# Patient Record
Sex: MALE | Race: ASIAN | Employment: OTHER | URBAN - METROPOLITAN AREA
[De-identification: names, ages, dates, MRNs, and addresses within clinical notes are randomized per-mention and may not be internally consistent; named-entity substitution may affect disease eponyms.]

---

## 2024-10-07 ENCOUNTER — HOSPITAL ENCOUNTER (OUTPATIENT)
Facility: HOSPITAL | Age: 79
Setting detail: OBSERVATION
Discharge: HOME | End: 2024-10-12
Attending: EMERGENCY MEDICINE | Admitting: STUDENT IN AN ORGANIZED HEALTH CARE EDUCATION/TRAINING PROGRAM
Payer: COMMERCIAL

## 2024-10-07 DIAGNOSIS — E87.6 HYPOKALEMIA: Primary | ICD-10-CM

## 2024-10-07 DIAGNOSIS — N17.9 AKI (ACUTE KIDNEY INJURY) (HCC): ICD-10-CM

## 2024-10-07 DIAGNOSIS — E87.6 ACUTE HYPOKALEMIA: ICD-10-CM

## 2024-10-07 DIAGNOSIS — R01.1 HEART MURMUR: ICD-10-CM

## 2024-10-07 DIAGNOSIS — N30.00 ACUTE CYSTITIS WITHOUT HEMATURIA: ICD-10-CM

## 2024-10-07 DIAGNOSIS — R94.31 ABNORMAL ELECTROCARDIOGRAPHY: ICD-10-CM

## 2024-10-07 DIAGNOSIS — E86.0 DEHYDRATION: ICD-10-CM

## 2024-10-07 PROCEDURE — 99285 EMERGENCY DEPT VISIT HI MDM: CPT

## 2024-10-08 ENCOUNTER — APPOINTMENT (OUTPATIENT)
Facility: HOSPITAL | Age: 79
End: 2024-10-08
Payer: COMMERCIAL

## 2024-10-08 PROBLEM — E87.6 HYPOKALEMIA: Status: ACTIVE | Noted: 2024-10-08

## 2024-10-08 LAB
ALBUMIN SERPL-MCNC: 2.9 G/DL (ref 3.5–5)
ALBUMIN/GLOB SERPL: 0.7 (ref 1.1–2.2)
ALP SERPL-CCNC: 64 U/L (ref 45–117)
ALT SERPL-CCNC: 14 U/L (ref 12–78)
ANION GAP SERPL CALC-SCNC: 9 MMOL/L (ref 2–12)
APPEARANCE UR: CLEAR
AST SERPL-CCNC: 43 U/L (ref 15–37)
BACTERIA URNS QL MICRO: NEGATIVE /HPF
BASOPHILS # BLD: 0 K/UL (ref 0–0.1)
BASOPHILS NFR BLD: 0 % (ref 0–1)
BILIRUB SERPL-MCNC: 0.8 MG/DL (ref 0.2–1)
BILIRUB UR QL: NEGATIVE
BUN SERPL-MCNC: 35 MG/DL (ref 6–20)
BUN/CREAT SERPL: 15 (ref 12–20)
CALCIUM SERPL-MCNC: 8.7 MG/DL (ref 8.5–10.1)
CHLORIDE SERPL-SCNC: 102 MMOL/L (ref 97–108)
CHOLEST SERPL-MCNC: 123 MG/DL
CO2 SERPL-SCNC: 28 MMOL/L (ref 21–32)
COLOR UR: ABNORMAL
COMMENT:: NORMAL
CREAT SERPL-MCNC: 2.31 MG/DL (ref 0.7–1.3)
CREAT UR-MCNC: 34.2 MG/DL
DIFFERENTIAL METHOD BLD: ABNORMAL
ECHO BSA: 1.46 M2
EOSINOPHIL # BLD: 0 K/UL (ref 0–0.4)
EOSINOPHIL NFR BLD: 0 % (ref 0–7)
EPITH CASTS URNS QL MICRO: ABNORMAL /LPF
ERYTHROCYTE [DISTWIDTH] IN BLOOD BY AUTOMATED COUNT: 13.5 % (ref 11.5–14.5)
EST. AVERAGE GLUCOSE BLD GHB EST-MCNC: 131 MG/DL
GLOBULIN SER CALC-MCNC: 4.3 G/DL (ref 2–4)
GLUCOSE BLD STRIP.AUTO-MCNC: 138 MG/DL (ref 65–117)
GLUCOSE SERPL-MCNC: 124 MG/DL (ref 65–100)
GLUCOSE UR STRIP.AUTO-MCNC: NEGATIVE MG/DL
HBA1C MFR BLD: 6.2 % (ref 4–5.6)
HCT VFR BLD AUTO: 36.5 % (ref 36.6–50.3)
HDLC SERPL-MCNC: 36 MG/DL
HDLC SERPL: 3.4 (ref 0–5)
HGB BLD-MCNC: 12.8 G/DL (ref 12.1–17)
HGB UR QL STRIP: ABNORMAL
HYALINE CASTS URNS QL MICRO: ABNORMAL /LPF (ref 0–2)
IMM GRANULOCYTES # BLD AUTO: 0.1 K/UL (ref 0–0.04)
IMM GRANULOCYTES NFR BLD AUTO: 1 % (ref 0–0.5)
KETONES UR QL STRIP.AUTO: NEGATIVE MG/DL
LACTATE BLD-SCNC: 1.25 MMOL/L (ref 0.4–2)
LACTATE SERPL-SCNC: 1.3 MMOL/L (ref 0.4–2)
LDLC SERPL CALC-MCNC: 73.2 MG/DL (ref 0–100)
LEUKOCYTE ESTERASE UR QL STRIP.AUTO: ABNORMAL
LYMPHOCYTES # BLD: 1.4 K/UL (ref 0.8–3.5)
LYMPHOCYTES NFR BLD: 13 % (ref 12–49)
MAGNESIUM SERPL-MCNC: 1.7 MG/DL (ref 1.6–2.4)
MCH RBC QN AUTO: 32 PG (ref 26–34)
MCHC RBC AUTO-ENTMCNC: 35.1 G/DL (ref 30–36.5)
MCV RBC AUTO: 91.3 FL (ref 80–99)
MONOCYTES # BLD: 0.9 K/UL (ref 0–1)
MONOCYTES NFR BLD: 8 % (ref 5–13)
NEUTS SEG # BLD: 8.1 K/UL (ref 1.8–8)
NEUTS SEG NFR BLD: 78 % (ref 32–75)
NITRITE UR QL STRIP.AUTO: NEGATIVE
NRBC # BLD: 0 K/UL (ref 0–0.01)
NRBC BLD-RTO: 0 PER 100 WBC
PH UR STRIP: 6.5 (ref 5–8)
PLATELET # BLD AUTO: 264 K/UL (ref 150–400)
PMV BLD AUTO: 9.4 FL (ref 8.9–12.9)
POTASSIUM SERPL-SCNC: 1.6 MMOL/L (ref 3.5–5.1)
POTASSIUM SERPL-SCNC: 2.3 MMOL/L (ref 3.5–5.1)
PROCALCITONIN SERPL-MCNC: 0.83 NG/ML
PROT SERPL-MCNC: 7.2 G/DL (ref 6.4–8.2)
PROT UR STRIP-MCNC: 30 MG/DL
RBC # BLD AUTO: 4 M/UL (ref 4.1–5.7)
RBC #/AREA URNS HPF: ABNORMAL /HPF (ref 0–5)
SERVICE CMNT-IMP: ABNORMAL
SODIUM SERPL-SCNC: 139 MMOL/L (ref 136–145)
SODIUM UR-SCNC: 67 MMOL/L
SP GR UR REFRACTOMETRY: 1.01
SPECIMEN HOLD: NORMAL
TRIGL SERPL-MCNC: 69 MG/DL
TROPONIN I SERPL HS-MCNC: 204 NG/L (ref 0–76)
TROPONIN I SERPL HS-MCNC: 220 NG/L (ref 0–76)
TROPONIN I SERPL HS-MCNC: 303 NG/L (ref 0–76)
URATE SERPL-MCNC: 10.3 MG/DL (ref 3.5–7.2)
URINE CULTURE IF INDICATED: ABNORMAL
UROBILINOGEN UR QL STRIP.AUTO: 0.2 EU/DL (ref 0.2–1)
UUN UR-MCNC: 235 MG/DL
VLDLC SERPL CALC-MCNC: 13.8 MG/DL
WBC # BLD AUTO: 10.5 K/UL (ref 4.1–11.1)
WBC URNS QL MICRO: ABNORMAL /HPF (ref 0–4)

## 2024-10-08 PROCEDURE — 96372 THER/PROPH/DIAG INJ SC/IM: CPT

## 2024-10-08 PROCEDURE — 96368 THER/DIAG CONCURRENT INF: CPT

## 2024-10-08 PROCEDURE — 2580000003 HC RX 258: Performed by: STUDENT IN AN ORGANIZED HEALTH CARE EDUCATION/TRAINING PROGRAM

## 2024-10-08 PROCEDURE — 6370000000 HC RX 637 (ALT 250 FOR IP): Performed by: STUDENT IN AN ORGANIZED HEALTH CARE EDUCATION/TRAINING PROGRAM

## 2024-10-08 PROCEDURE — 3430000000 HC RX DIAGNOSTIC RADIOPHARMACEUTICAL: Performed by: NURSE PRACTITIONER

## 2024-10-08 PROCEDURE — 84132 ASSAY OF SERUM POTASSIUM: CPT

## 2024-10-08 PROCEDURE — 96366 THER/PROPH/DIAG IV INF ADDON: CPT

## 2024-10-08 PROCEDURE — G0378 HOSPITAL OBSERVATION PER HR: HCPCS

## 2024-10-08 PROCEDURE — 6370000000 HC RX 637 (ALT 250 FOR IP): Performed by: NURSE PRACTITIONER

## 2024-10-08 PROCEDURE — 93005 ELECTROCARDIOGRAM TRACING: CPT | Performed by: EMERGENCY MEDICINE

## 2024-10-08 PROCEDURE — 81001 URINALYSIS AUTO W/SCOPE: CPT

## 2024-10-08 PROCEDURE — 96365 THER/PROPH/DIAG IV INF INIT: CPT

## 2024-10-08 PROCEDURE — 87086 URINE CULTURE/COLONY COUNT: CPT

## 2024-10-08 PROCEDURE — 2500000003 HC RX 250 WO HCPCS: Performed by: STUDENT IN AN ORGANIZED HEALTH CARE EDUCATION/TRAINING PROGRAM

## 2024-10-08 PROCEDURE — 80053 COMPREHEN METABOLIC PANEL: CPT

## 2024-10-08 PROCEDURE — 83605 ASSAY OF LACTIC ACID: CPT

## 2024-10-08 PROCEDURE — 80061 LIPID PANEL: CPT

## 2024-10-08 PROCEDURE — 84540 ASSAY OF URINE/UREA-N: CPT

## 2024-10-08 PROCEDURE — 84484 ASSAY OF TROPONIN QUANT: CPT

## 2024-10-08 PROCEDURE — 84300 ASSAY OF URINE SODIUM: CPT

## 2024-10-08 PROCEDURE — 82570 ASSAY OF URINE CREATININE: CPT

## 2024-10-08 PROCEDURE — 93017 CV STRESS TEST TRACING ONLY: CPT

## 2024-10-08 PROCEDURE — 83036 HEMOGLOBIN GLYCOSYLATED A1C: CPT

## 2024-10-08 PROCEDURE — 71045 X-RAY EXAM CHEST 1 VIEW: CPT

## 2024-10-08 PROCEDURE — 84145 PROCALCITONIN (PCT): CPT

## 2024-10-08 PROCEDURE — 96375 TX/PRO/DX INJ NEW DRUG ADDON: CPT

## 2024-10-08 PROCEDURE — 87040 BLOOD CULTURE FOR BACTERIA: CPT

## 2024-10-08 PROCEDURE — 6360000002 HC RX W HCPCS: Performed by: STUDENT IN AN ORGANIZED HEALTH CARE EDUCATION/TRAINING PROGRAM

## 2024-10-08 PROCEDURE — A9500 TC99M SESTAMIBI: HCPCS | Performed by: NURSE PRACTITIONER

## 2024-10-08 PROCEDURE — 93970 EXTREMITY STUDY: CPT

## 2024-10-08 PROCEDURE — 85025 COMPLETE CBC W/AUTO DIFF WBC: CPT

## 2024-10-08 PROCEDURE — 6360000002 HC RX W HCPCS: Performed by: EMERGENCY MEDICINE

## 2024-10-08 PROCEDURE — 82962 GLUCOSE BLOOD TEST: CPT

## 2024-10-08 PROCEDURE — 51702 INSERT TEMP BLADDER CATH: CPT

## 2024-10-08 PROCEDURE — 36415 COLL VENOUS BLD VENIPUNCTURE: CPT

## 2024-10-08 PROCEDURE — 83735 ASSAY OF MAGNESIUM: CPT

## 2024-10-08 PROCEDURE — 84550 ASSAY OF BLOOD/URIC ACID: CPT

## 2024-10-08 PROCEDURE — 76775 US EXAM ABDO BACK WALL LIM: CPT

## 2024-10-08 PROCEDURE — 78452 HT MUSCLE IMAGE SPECT MULT: CPT

## 2024-10-08 PROCEDURE — 2580000003 HC RX 258: Performed by: EMERGENCY MEDICINE

## 2024-10-08 RX ORDER — ATORVASTATIN CALCIUM 10 MG/1
10 TABLET, FILM COATED ORAL NIGHTLY
Status: DISCONTINUED | OUTPATIENT
Start: 2024-10-08 | End: 2024-10-11

## 2024-10-08 RX ORDER — ASPIRIN 81 MG/1
81 TABLET, CHEWABLE ORAL DAILY
Status: DISCONTINUED | OUTPATIENT
Start: 2024-10-08 | End: 2024-10-12 | Stop reason: HOSPADM

## 2024-10-08 RX ORDER — ACETAMINOPHEN 650 MG/1
650 SUPPOSITORY RECTAL EVERY 6 HOURS PRN
Status: DISCONTINUED | OUTPATIENT
Start: 2024-10-08 | End: 2024-10-11

## 2024-10-08 RX ORDER — SODIUM CHLORIDE 0.9 % (FLUSH) 0.9 %
5-40 SYRINGE (ML) INJECTION EVERY 12 HOURS SCHEDULED
Status: DISCONTINUED | OUTPATIENT
Start: 2024-10-08 | End: 2024-10-12 | Stop reason: HOSPADM

## 2024-10-08 RX ORDER — LIDOCAINE HYDROCHLORIDE 20 MG/ML
JELLY TOPICAL PRN
Status: DISCONTINUED | OUTPATIENT
Start: 2024-10-08 | End: 2024-10-12 | Stop reason: HOSPADM

## 2024-10-08 RX ORDER — SODIUM CHLORIDE, SODIUM LACTATE, POTASSIUM CHLORIDE, AND CALCIUM CHLORIDE .6; .31; .03; .02 G/100ML; G/100ML; G/100ML; G/100ML
1000 INJECTION, SOLUTION INTRAVENOUS ONCE
Status: COMPLETED | OUTPATIENT
Start: 2024-10-08 | End: 2024-10-08

## 2024-10-08 RX ORDER — POTASSIUM CHLORIDE 1500 MG/1
40 TABLET, EXTENDED RELEASE ORAL
Status: COMPLETED | OUTPATIENT
Start: 2024-10-08 | End: 2024-10-08

## 2024-10-08 RX ORDER — POTASSIUM CHLORIDE 7.45 MG/ML
10 INJECTION INTRAVENOUS ONCE
Status: COMPLETED | OUTPATIENT
Start: 2024-10-08 | End: 2024-10-08

## 2024-10-08 RX ORDER — HEPARIN SODIUM 5000 [USP'U]/ML
5000 INJECTION, SOLUTION INTRAVENOUS; SUBCUTANEOUS 2 TIMES DAILY
Status: DISCONTINUED | OUTPATIENT
Start: 2024-10-08 | End: 2024-10-12 | Stop reason: HOSPADM

## 2024-10-08 RX ORDER — POTASSIUM CHLORIDE 7.45 MG/ML
10 INJECTION INTRAVENOUS ONCE
Status: DISCONTINUED | OUTPATIENT
Start: 2024-10-08 | End: 2024-10-08

## 2024-10-08 RX ORDER — SODIUM CHLORIDE 9 MG/ML
INJECTION, SOLUTION INTRAVENOUS PRN
Status: DISCONTINUED | OUTPATIENT
Start: 2024-10-08 | End: 2024-10-12 | Stop reason: HOSPADM

## 2024-10-08 RX ORDER — ONDANSETRON 4 MG/1
4 TABLET, ORALLY DISINTEGRATING ORAL EVERY 8 HOURS PRN
Status: DISCONTINUED | OUTPATIENT
Start: 2024-10-08 | End: 2024-10-12 | Stop reason: HOSPADM

## 2024-10-08 RX ORDER — HYDRALAZINE HYDROCHLORIDE 20 MG/ML
10 INJECTION INTRAMUSCULAR; INTRAVENOUS EVERY 6 HOURS PRN
Status: DISCONTINUED | OUTPATIENT
Start: 2024-10-08 | End: 2024-10-12 | Stop reason: HOSPADM

## 2024-10-08 RX ORDER — SODIUM CHLORIDE AND POTASSIUM CHLORIDE 150; 900 MG/100ML; MG/100ML
INJECTION, SOLUTION INTRAVENOUS CONTINUOUS
Status: DISCONTINUED | OUTPATIENT
Start: 2024-10-08 | End: 2024-10-09

## 2024-10-08 RX ORDER — TETRAKIS(2-METHOXYISOBUTYLISOCYANIDE)COPPER(I) TETRAFLUOROBORATE 1 MG/ML
32.3 INJECTION, POWDER, LYOPHILIZED, FOR SOLUTION INTRAVENOUS
Status: COMPLETED | OUTPATIENT
Start: 2024-10-08 | End: 2024-10-08

## 2024-10-08 RX ORDER — SODIUM CHLORIDE 0.9 % (FLUSH) 0.9 %
5-40 SYRINGE (ML) INJECTION PRN
Status: DISCONTINUED | OUTPATIENT
Start: 2024-10-08 | End: 2024-10-12 | Stop reason: HOSPADM

## 2024-10-08 RX ORDER — POLYETHYLENE GLYCOL 3350 17 G/17G
17 POWDER, FOR SOLUTION ORAL DAILY PRN
Status: DISCONTINUED | OUTPATIENT
Start: 2024-10-08 | End: 2024-10-12 | Stop reason: HOSPADM

## 2024-10-08 RX ORDER — ACETAMINOPHEN 325 MG/1
650 TABLET ORAL EVERY 6 HOURS PRN
Status: DISCONTINUED | OUTPATIENT
Start: 2024-10-08 | End: 2024-10-11

## 2024-10-08 RX ORDER — AMLODIPINE BESYLATE 5 MG/1
10 TABLET ORAL DAILY
Status: DISCONTINUED | OUTPATIENT
Start: 2024-10-08 | End: 2024-10-11

## 2024-10-08 RX ORDER — ONDANSETRON 2 MG/ML
4 INJECTION INTRAMUSCULAR; INTRAVENOUS EVERY 6 HOURS PRN
Status: DISCONTINUED | OUTPATIENT
Start: 2024-10-08 | End: 2024-10-12 | Stop reason: HOSPADM

## 2024-10-08 RX ORDER — HYDROMORPHONE HYDROCHLORIDE 1 MG/ML
0.25 INJECTION, SOLUTION INTRAMUSCULAR; INTRAVENOUS; SUBCUTANEOUS EVERY 4 HOURS PRN
Status: DISCONTINUED | OUTPATIENT
Start: 2024-10-08 | End: 2024-10-12 | Stop reason: HOSPADM

## 2024-10-08 RX ADMIN — SODIUM CHLORIDE, PRESERVATIVE FREE 10 ML: 5 INJECTION INTRAVENOUS at 21:23

## 2024-10-08 RX ADMIN — AMLODIPINE BESYLATE 10 MG: 5 TABLET ORAL at 18:29

## 2024-10-08 RX ADMIN — POTASSIUM CHLORIDE 40 MEQ: 1500 TABLET, EXTENDED RELEASE ORAL at 15:10

## 2024-10-08 RX ADMIN — SODIUM CHLORIDE 1000 MG: 900 INJECTION INTRAVENOUS at 05:08

## 2024-10-08 RX ADMIN — ASPIRIN 81 MG: 81 TABLET, CHEWABLE ORAL at 11:17

## 2024-10-08 RX ADMIN — ATORVASTATIN CALCIUM 10 MG: 10 TABLET, FILM COATED ORAL at 21:23

## 2024-10-08 RX ADMIN — POTASSIUM CHLORIDE 10 MEQ: 7.46 INJECTION, SOLUTION INTRAVENOUS at 05:09

## 2024-10-08 RX ADMIN — POTASSIUM CHLORIDE 40 MEQ: 1500 TABLET, EXTENDED RELEASE ORAL at 09:34

## 2024-10-08 RX ADMIN — HYDROMORPHONE HYDROCHLORIDE 0.25 MG: 1 INJECTION, SOLUTION INTRAMUSCULAR; INTRAVENOUS; SUBCUTANEOUS at 14:34

## 2024-10-08 RX ADMIN — SODIUM CHLORIDE AND POTASSIUM CHLORIDE: 150; 900 INJECTION, SOLUTION INTRAVENOUS at 22:06

## 2024-10-08 RX ADMIN — POTASSIUM CHLORIDE 10 MEQ: 7.46 INJECTION, SOLUTION INTRAVENOUS at 03:50

## 2024-10-08 RX ADMIN — HEPARIN SODIUM 5000 UNITS: 5000 INJECTION INTRAVENOUS; SUBCUTANEOUS at 21:23

## 2024-10-08 RX ADMIN — SODIUM CHLORIDE AND POTASSIUM CHLORIDE: 150; 900 INJECTION, SOLUTION INTRAVENOUS at 09:39

## 2024-10-08 RX ADMIN — HEPARIN SODIUM 5000 UNITS: 5000 INJECTION INTRAVENOUS; SUBCUTANEOUS at 09:35

## 2024-10-08 RX ADMIN — POTASSIUM CHLORIDE 40 MEQ: 1500 TABLET, EXTENDED RELEASE ORAL at 15:11

## 2024-10-08 RX ADMIN — SODIUM CHLORIDE, POTASSIUM CHLORIDE, SODIUM LACTATE AND CALCIUM CHLORIDE 1000 ML: 600; 310; 30; 20 INJECTION, SOLUTION INTRAVENOUS at 03:22

## 2024-10-08 RX ADMIN — LIDOCAINE HYDROCHLORIDE: 20 JELLY TOPICAL at 14:35

## 2024-10-08 RX ADMIN — SODIUM CHLORIDE, PRESERVATIVE FREE 10 ML: 5 INJECTION INTRAVENOUS at 11:18

## 2024-10-08 RX ADMIN — TETRAKIS(2-METHOXYISOBUTYLISOCYANIDE)COPPER(I) TETRAFLUOROBORATE 32.3 MILLICURIE: 1 INJECTION, POWDER, LYOPHILIZED, FOR SOLUTION INTRAVENOUS at 11:45

## 2024-10-08 ASSESSMENT — PAIN - FUNCTIONAL ASSESSMENT: PAIN_FUNCTIONAL_ASSESSMENT: PREVENTS OR INTERFERES SOME ACTIVE ACTIVITIES AND ADLS

## 2024-10-08 ASSESSMENT — LIFESTYLE VARIABLES
HOW OFTEN DO YOU HAVE A DRINK CONTAINING ALCOHOL: NEVER
HOW MANY STANDARD DRINKS CONTAINING ALCOHOL DO YOU HAVE ON A TYPICAL DAY: PATIENT DOES NOT DRINK

## 2024-10-08 ASSESSMENT — PAIN SCALES - GENERAL
PAINLEVEL_OUTOF10: 0
PAINLEVEL_OUTOF10: 8

## 2024-10-08 ASSESSMENT — PAIN DESCRIPTION - LOCATION: LOCATION: ABDOMEN

## 2024-10-08 ASSESSMENT — PAIN DESCRIPTION - ORIENTATION: ORIENTATION: LOWER

## 2024-10-08 ASSESSMENT — PAIN DESCRIPTION - DESCRIPTORS: DESCRIPTORS: PRESSURE

## 2024-10-08 NOTE — ED NOTES
Assumed care of pt at this time. Bedside report received from AMY Pretty. Pt resting in bed on monitor x3 with call bell in reach, on bed alarm,  at bedside.

## 2024-10-08 NOTE — H&P
Hospitalist Admission Note    NAME:   Abe Duque   : 1945   MRN: [id number]     Date/Time: 10/8/2024 8:22 AM    Patient PCP: No primary care provider on file.    ______________________________________________________________________  Given the patient's current clinical presentation, I have a high level of concern for decompensation if discharged from the emergency department.  Complex decision making was performed, which includes reviewing the patient's available past medical records, laboratory results, and x-ray films.       My assessment of this patient's clinical condition and my plan of care is as follows.    Assessment / Plan:  Generalised weakness   Likely UTI   Urinary retention - with difficulty in passing urine for last week  History of left ureteral stent for 10 mm stone  Likely urethral stricture  Patient admitted to medical unit with telemetry  White cell count of 10.5  Lactic acid 1.25  Procalcitonin 0.83  Chest x-ray on 10/8/2024 no acute process  Urinalysis with large leukocyte esterase large blood protein  WBC  We will get urine culture  Started patient on ceftriaxone  Continue current IV fluids  Bladder catheterization done in the ED with ruchi  Urology consulted-discussed with urology NP Yecenia-discussed with her about possible strictures.  Advised to get renal ultrasound.  Needs PT OT prior to discharge    Likely JUNE on CKD  Last known BMP in care everywhere in  was with creatinine 1.60  Creatinine on arrival 2.31  Possibly obstructive component  Ordered for urine-sodium creatinine urea  Will check renal ultrasound  Renal consult if creatinine continues to get worse    Hypokalemia  Likely due to malnourishment  Supplemented potassium  Continue monitoring on telemetry  Current EKG with right bundle branch block left intrafascicular block    Troponin elevation of unclear significance  EKG with bundle branch block  Patient not complaining of any chest pain at this  10/8/2024  EXAM:  XR CHEST PORTABLE INDICATION: Weakness COMPARISON: None TECHNIQUE: Portable AP semiupright chest view at 0226 hours FINDINGS: The cardiac silhouette is within normal limits. The pulmonary vasculature is within normal limits. The lungs and pleural spaces are clear. There is no pneumothorax. The visualized bones and upper abdomen are age-appropriate.     No acute process. Electronically signed by Laron Quintana     _______________________________________________________________________    TOTAL TIME:  76 Minutes    Critical Care Provided     Minutes non procedure based    Signed: Grupo Cooney MD    Procedures: see electronic medical records for all procedures/Xrays and details which were not copied into this note but were reviewed prior to creation of Plan.

## 2024-10-08 NOTE — ED NOTES
Urology came top bedside. This writer at bedside with Hospitalist for Driscoll placement. Placed 14f coude small amount of resistance from stricture. Successful placement. Pt void 350 clear yellow urine. Pt had incontinent episode of urine saturating pt diaper. RN turned and cleaned pt. IV came out and lead tech called to bedside for placement.

## 2024-10-08 NOTE — ED NOTES
Bedside shift change report given to AMY Keene (oncoming nurse) by AMY Lamar (offgoing nurse). Report included the following information Nurse Handoff Report.

## 2024-10-08 NOTE — ED PROVIDER NOTES
[facility] EMERGENCY DEPT  EMERGENCY DEPARTMENT ENCOUNTER         Pt Name: Abe Duque  MRN: [id number]  Birthdate 1945  Date of evaluation: 10/7/2024  Provider: Anne-Marie Ulrich MD   PCP: No primary care provider on file.  Note Started: 4:40 AM 10/8/24     CHIEF COMPLAINT       Chief Complaint   Patient presents with    Extremity Weakness     Patient arrives via ems with c/o general weakness and left foot pain/swelling x 2 days.    Foot Pain    Dysuria     Pt also c/o pain when urinating x 1 week        HISTORY OF PRESENT ILLNESS: 1 or more elements      History From: Patient and Patient's grandson  HPI Limitations: Language Barrier (used  and aid of grandson to communicate)     Abe Duque is a 79 y.o. male whose medical history is listed below is brought to the ED by EMS and accompanied by his grandson for complaints of generalized weakness for about a week and for 3 days has been reporting dysuria and increased urinary frequency. He reportedly also was complaining of left foot pain and swelling but this had resolved yesterday. No history of trauma to the foot. No fever, however patient has had poor po intake for the past 3-5 days and has been very lethargic. No nausea or vomiting, no reported abd pain, chest pain, sob, cough or other associated symptoms.   Pt denies any other exacerbating or ameliorating factors. There are no other complaints, changes or physical findings pertinent to the HPI at this time.        PAST HISTORY     Past Medical History:  No past medical history on file.    Past Surgical History:  No past surgical history on file.    Family History:  No family history on file.    Social History:       Allergies:  No Known Allergies    Medical Records Review:  I reviewed and interpreted the nursing notes and and vital signs from today's visit, as well as the electronic medical record system for any external medical records that were available that may contribute to the patients

## 2024-10-08 NOTE — CONSULTS
[facility]        Consult    NAME: Abe Duque   :  1945   MRN:  [id number]     Date/Time:  10/8/2024 10:14 AM    Patient PCP: No primary care provider on file.  ________________________________________________________________________      Attending attestation:     I saw and evaluated Abe Duque on 10/08/24.  The  case was discussed with Molly Mas NP.  I personally reviewed the HPI, PMH, FMH, Soc Hx, ROS, and medications.  I repeated pertinent portions of the examination and reviewed the relevant imaging and laboratory data.  I agree with the findings, assessment, and plan as documented.      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  Does have conduction system abnormality at baseline.  Avoid AV jamal blocking agent.  He does not have list of medications was taking at home.    Unclear cause for troponin elevation, appears to be type II.    Check 2D echocardiogram    Discussed about outpatient stress test for risk stratification, he would like to get it done while he is in the hospital        Thank you for this consult and allowing me to take part in this patients care.  Please call with questions.      Signed By: Mary Estevez MD     2024            _____________________________________   Assessment:     CAD  Essential Hypertension  Hyperlipidemia  History of RBBB, LAFB  Sinus    Vascular duplex lower extremity venous bilateral    Collection Time: 10/08/24  9:34 AM   Result Value Ref Range    Body Surface Area 1.46 m2

## 2024-10-09 ENCOUNTER — APPOINTMENT (OUTPATIENT)
Facility: HOSPITAL | Age: 79
End: 2024-10-09
Payer: COMMERCIAL

## 2024-10-09 LAB
ANION GAP SERPL CALC-SCNC: 5 MMOL/L (ref 2–12)
ANION GAP SERPL CALC-SCNC: 9 MMOL/L (ref 2–12)
BACTERIA SPEC CULT: NORMAL
BASOPHILS # BLD: 0 K/UL (ref 0–0.1)
BASOPHILS NFR BLD: 0 % (ref 0–1)
BUN SERPL-MCNC: 20 MG/DL (ref 6–20)
BUN SERPL-MCNC: 22 MG/DL (ref 6–20)
BUN/CREAT SERPL: 12 (ref 12–20)
BUN/CREAT SERPL: 13 (ref 12–20)
CALCIUM SERPL-MCNC: 8.1 MG/DL (ref 8.5–10.1)
CALCIUM SERPL-MCNC: 8.1 MG/DL (ref 8.5–10.1)
CHLORIDE SERPL-SCNC: 104 MMOL/L (ref 97–108)
CHLORIDE SERPL-SCNC: 109 MMOL/L (ref 97–108)
CO2 SERPL-SCNC: 27 MMOL/L (ref 21–32)
CO2 SERPL-SCNC: 28 MMOL/L (ref 21–32)
CREAT SERPL-MCNC: 1.61 MG/DL (ref 0.7–1.3)
CREAT SERPL-MCNC: 1.71 MG/DL (ref 0.7–1.3)
DIFFERENTIAL METHOD BLD: ABNORMAL
EKG ATRIAL RATE: 69 BPM
EKG DIAGNOSIS: NORMAL
EKG DIAGNOSIS: NORMAL
EKG P AXIS: -28 DEGREES
EKG P-R INTERVAL: 204 MS
EKG Q-T INTERVAL: 494 MS
EKG QRS DURATION: 190 MS
EKG QTC CALCULATION (BAZETT): 529 MS
EKG R AXIS: -85 DEGREES
EKG T AXIS: 60 DEGREES
EKG VENTRICULAR RATE: 69 BPM
EOSINOPHIL # BLD: 0.1 K/UL (ref 0–0.4)
EOSINOPHIL NFR BLD: 1 % (ref 0–7)
ERYTHROCYTE [DISTWIDTH] IN BLOOD BY AUTOMATED COUNT: 13.8 % (ref 11.5–14.5)
GLUCOSE SERPL-MCNC: 101 MG/DL (ref 65–100)
GLUCOSE SERPL-MCNC: 121 MG/DL (ref 65–100)
HCT VFR BLD AUTO: 36.5 % (ref 36.6–50.3)
HGB BLD-MCNC: 12.4 G/DL (ref 12.1–17)
IMM GRANULOCYTES # BLD AUTO: 0 K/UL (ref 0–0.04)
IMM GRANULOCYTES NFR BLD AUTO: 0 % (ref 0–0.5)
LYMPHOCYTES # BLD: 1.1 K/UL (ref 0.8–3.5)
LYMPHOCYTES NFR BLD: 12 % (ref 12–49)
MAGNESIUM SERPL-MCNC: 1.5 MG/DL (ref 1.6–2.4)
MCH RBC QN AUTO: 31.5 PG (ref 26–34)
MCHC RBC AUTO-ENTMCNC: 34 G/DL (ref 30–36.5)
MCV RBC AUTO: 92.6 FL (ref 80–99)
MONOCYTES # BLD: 0.5 K/UL (ref 0–1)
MONOCYTES NFR BLD: 6 % (ref 5–13)
NEUTS SEG # BLD: 7.5 K/UL (ref 1.8–8)
NEUTS SEG NFR BLD: 81 % (ref 32–75)
NRBC # BLD: 0 K/UL (ref 0–0.01)
NRBC BLD-RTO: 0 PER 100 WBC
PLATELET # BLD AUTO: 247 K/UL (ref 150–400)
PMV BLD AUTO: 9.2 FL (ref 8.9–12.9)
POTASSIUM SERPL-SCNC: 2.5 MMOL/L (ref 3.5–5.1)
POTASSIUM SERPL-SCNC: 4.4 MMOL/L (ref 3.5–5.1)
RBC # BLD AUTO: 3.94 M/UL (ref 4.1–5.7)
SERVICE CMNT-IMP: NORMAL
SODIUM SERPL-SCNC: 140 MMOL/L (ref 136–145)
SODIUM SERPL-SCNC: 142 MMOL/L (ref 136–145)
STRESS BASELINE DIAS BP: 77 MMHG
STRESS BASELINE HR: 52 BPM
STRESS BASELINE SYS BP: 174 MMHG
STRESS ESTIMATED WORKLOAD: 1 METS
STRESS O2 SAT PEAK: 96 %
STRESS O2 SAT REST: 94 %
STRESS PEAK DIAS BP: 77 MMHG
STRESS PEAK SYS BP: 174 MMHG
STRESS PERCENT HR ACHIEVED: 48 %
STRESS POST PEAK HR: 67 BPM
STRESS RATE PRESSURE PRODUCT: NORMAL BPM*MMHG
STRESS TARGET HR: 141 BPM
WBC # BLD AUTO: 9.2 K/UL (ref 4.1–11.1)

## 2024-10-09 PROCEDURE — 2580000003 HC RX 258: Performed by: STUDENT IN AN ORGANIZED HEALTH CARE EDUCATION/TRAINING PROGRAM

## 2024-10-09 PROCEDURE — 96372 THER/PROPH/DIAG INJ SC/IM: CPT

## 2024-10-09 PROCEDURE — G0378 HOSPITAL OBSERVATION PER HR: HCPCS

## 2024-10-09 PROCEDURE — 74176 CT ABD & PELVIS W/O CONTRAST: CPT

## 2024-10-09 PROCEDURE — 6360000002 HC RX W HCPCS: Performed by: STUDENT IN AN ORGANIZED HEALTH CARE EDUCATION/TRAINING PROGRAM

## 2024-10-09 PROCEDURE — 85025 COMPLETE CBC W/AUTO DIFF WBC: CPT

## 2024-10-09 PROCEDURE — 6370000000 HC RX 637 (ALT 250 FOR IP): Performed by: NURSE PRACTITIONER

## 2024-10-09 PROCEDURE — 6370000000 HC RX 637 (ALT 250 FOR IP): Performed by: INTERNAL MEDICINE

## 2024-10-09 PROCEDURE — 6370000000 HC RX 637 (ALT 250 FOR IP): Performed by: STUDENT IN AN ORGANIZED HEALTH CARE EDUCATION/TRAINING PROGRAM

## 2024-10-09 PROCEDURE — 6360000002 HC RX W HCPCS: Performed by: INTERNAL MEDICINE

## 2024-10-09 PROCEDURE — 36415 COLL VENOUS BLD VENIPUNCTURE: CPT

## 2024-10-09 PROCEDURE — 96366 THER/PROPH/DIAG IV INF ADDON: CPT

## 2024-10-09 PROCEDURE — 80048 BASIC METABOLIC PNL TOTAL CA: CPT

## 2024-10-09 PROCEDURE — 3430000000 HC RX DIAGNOSTIC RADIOPHARMACEUTICAL: Performed by: STUDENT IN AN ORGANIZED HEALTH CARE EDUCATION/TRAINING PROGRAM

## 2024-10-09 PROCEDURE — 83735 ASSAY OF MAGNESIUM: CPT

## 2024-10-09 PROCEDURE — A9500 TC99M SESTAMIBI: HCPCS | Performed by: STUDENT IN AN ORGANIZED HEALTH CARE EDUCATION/TRAINING PROGRAM

## 2024-10-09 PROCEDURE — 96367 TX/PROPH/DG ADDL SEQ IV INF: CPT

## 2024-10-09 RX ORDER — TETRAKIS(2-METHOXYISOBUTYLISOCYANIDE)COPPER(I) TETRAFLUOROBORATE 1 MG/ML
30.7 INJECTION, POWDER, LYOPHILIZED, FOR SOLUTION INTRAVENOUS
Status: COMPLETED | OUTPATIENT
Start: 2024-10-09 | End: 2024-10-09

## 2024-10-09 RX ORDER — MAGNESIUM SULFATE IN WATER 40 MG/ML
2000 INJECTION, SOLUTION INTRAVENOUS ONCE
Status: COMPLETED | OUTPATIENT
Start: 2024-10-09 | End: 2024-10-09

## 2024-10-09 RX ORDER — POTASSIUM CHLORIDE 1500 MG/1
40 TABLET, EXTENDED RELEASE ORAL
Status: DISPENSED | OUTPATIENT
Start: 2024-10-09 | End: 2024-10-09

## 2024-10-09 RX ORDER — POTASSIUM CHLORIDE 1500 MG/1
40 TABLET, EXTENDED RELEASE ORAL EVERY 6 HOURS
Status: DISPENSED | OUTPATIENT
Start: 2024-10-09 | End: 2024-10-09

## 2024-10-09 RX ORDER — SODIUM CHLORIDE AND POTASSIUM CHLORIDE 300; 900 MG/100ML; MG/100ML
INJECTION, SOLUTION INTRAVENOUS CONTINUOUS
Status: DISPENSED | OUTPATIENT
Start: 2024-10-09 | End: 2024-10-09

## 2024-10-09 RX ADMIN — POTASSIUM CHLORIDE 40 MEQ: 1500 TABLET, EXTENDED RELEASE ORAL at 06:30

## 2024-10-09 RX ADMIN — POTASSIUM CHLORIDE 40 MEQ: 1500 TABLET, EXTENDED RELEASE ORAL at 16:23

## 2024-10-09 RX ADMIN — ATORVASTATIN CALCIUM 10 MG: 10 TABLET, FILM COATED ORAL at 21:02

## 2024-10-09 RX ADMIN — HEPARIN SODIUM 5000 UNITS: 5000 INJECTION INTRAVENOUS; SUBCUTANEOUS at 10:36

## 2024-10-09 RX ADMIN — MAGNESIUM SULFATE HEPTAHYDRATE 2000 MG: 40 INJECTION, SOLUTION INTRAVENOUS at 10:44

## 2024-10-09 RX ADMIN — HEPARIN SODIUM 5000 UNITS: 5000 INJECTION INTRAVENOUS; SUBCUTANEOUS at 21:03

## 2024-10-09 RX ADMIN — POTASSIUM CHLORIDE AND SODIUM CHLORIDE: 900; 300 INJECTION, SOLUTION INTRAVENOUS at 11:27

## 2024-10-09 RX ADMIN — SODIUM CHLORIDE, PRESERVATIVE FREE 10 ML: 5 INJECTION INTRAVENOUS at 21:03

## 2024-10-09 RX ADMIN — TETRAKIS(2-METHOXYISOBUTYLISOCYANIDE)COPPER(I) TETRAFLUOROBORATE 30.7 MILLICURIE: 1 INJECTION, POWDER, LYOPHILIZED, FOR SOLUTION INTRAVENOUS at 08:20

## 2024-10-09 RX ADMIN — SODIUM CHLORIDE 1000 MG: 900 INJECTION INTRAVENOUS at 00:32

## 2024-10-09 RX ADMIN — SODIUM CHLORIDE, PRESERVATIVE FREE 10 ML: 5 INJECTION INTRAVENOUS at 10:37

## 2024-10-09 RX ADMIN — AMLODIPINE BESYLATE 10 MG: 5 TABLET ORAL at 10:36

## 2024-10-09 RX ADMIN — POTASSIUM CHLORIDE 40 MEQ: 1500 TABLET, EXTENDED RELEASE ORAL at 10:37

## 2024-10-09 RX ADMIN — ASPIRIN 81 MG: 81 TABLET, CHEWABLE ORAL at 10:36

## 2024-10-09 ASSESSMENT — PAIN SCALES - GENERAL: PAINLEVEL_OUTOF10: 0

## 2024-10-09 NOTE — PROGRESS NOTES
Nursing contacted Nocturnist/cross cover provider via non-urgent messaging system [employer] and notified patient k 2.5 up from 2.3 yesterday, asymptomatic, on cmivf w/ 20meq kcl, received 3 doses kcl 40meq yesterday. No other concerns reported. No acute distress reported. No other information provided by nurse. VSS.     Ordered repeated po dosing as above given yesterday, cmivf continue as already ordered, repeat bmp w/ reflex to mag s/p repletion ordered- defer to dayshift for further mgmt/eval. Will defer further evaluation/management to the day shift primary attending care team. Patient denies any further complaints or concerns.     Nursing to notify Hospitalist for further/continued concerns. Will remain available overnight for further concerns if nursing/patient needs. Please note, there are RRT systems in this hospital in place that if nursing has acute or critical patient condition change or concern, this is to help facilitate and notify that patient needs immediate bedside evaluation by a provider.     Non-billable note.

## 2024-10-09 NOTE — PROGRESS NOTES
End of Shift Note    Bedside shift change report given to AMY Cardoso (oncoming nurse) by RENALDO BILLINGSLEY RN (offgoing nurse).  Report included the following information SBAR REPORTS LIST: SBAR, Intake/Output, MAR, Recent Results, and Cardiac Rhythm sinus rhythm    Shift worked:  6926-2879     Shift summary and any significant changes:     No events over night, patient verbalized no pain. Nursing care is on going. Patient is NPO for stress test today.     Concerns for physician to address:         Zone phone for oncoming shift:            Activity:  Activity: Out of bed with assistance  Number times ambulated in hallways past shift: 0  Number of times OOB to chair past shift: 0    Cardiac:   Cardiac Monitoring: YES / NO: Yes        Access:  Current line(s): IV ACCESS: - PIV    Genitourinary:   Urinary status: Urinary status: Indwelling catheter is draining well.    Respiratory:   oxygen delivery: room air  Chronic home O2 use?: YES / NO: No  Incentive spirometer at bedside: YES / NO: No      GI:  Current diet:  DIET: NPO  Passing flatus: YES / NO: Yes  Tolerating current diet: YES / NO: Yes      Pain Management:   Patient states pain is manageable on current regimen: YES / NO: Yes    Skin:    Interventions: DEENA SCALE: 18    Patient Safety:  Fall Score: FALL RISK ASSESSMENT: At risk due to:  Weakness  Interventions: Fall Interventions Provided: Implemented/recommended use of non-skid footwear, Implemented/recommended use of fall risk identification flag to all team members, and Implemented/recommended assistive devices and encouraged their use      Length of Stay:  Expected LOS: 2  Actual LOS: 0      RENALDO BILLINGSLEY RN

## 2024-10-09 NOTE — PROGRESS NOTES
10/9/2024 0520  Gross per 24 hour   Intake 100 ml   Output 2500 ml   Net -2400 ml         General: Alert and oriented x3, no acute distress   Neck: Supple   Respiratory: No respiratory distress, clear lung sound   Cardiovascular: Regular rate rhythm, S1S2, +  murmur   Abdomen: soft, non tender, non distended   Neuro: moves all extremities, oriented x3   Skin: warm and dry   Extremity: no edema, warm to touch       Data Review    Telemetry: normal sinus rhythm      Lab Data Personally Reviewed:    Recent Labs     10/08/24  0214 10/09/24  0511   WBC 10.5 9.2   HGB 12.8 12.4   HCT 36.5* 36.5*    247     No results for input(s): \"INR\", \"APTT\" in the last 72 hours.    Invalid input(s): \"PTP\"   Recent Labs     10/08/24  0214 10/08/24  0332 10/08/24  1542 10/09/24  0511     --   --  140   K 1.6*  --  2.3* 2.5*     --   --  104   CO2 28  --   --  27   BUN 35*  --   --  22*   MG  --  1.7  --  1.5*     No results for input(s): \"CPK\" in the last 72 hours.    Invalid input(s): \"CPKMB\", \"CKNDX\", \"TROIQ\"  Lab Results   Component Value Date/Time    CHOL 123 10/08/2024 07:44 AM    HDL 36 10/08/2024 07:44 AM    LDL 73.2 10/08/2024 07:44 AM       Recent Labs     10/08/24  0214   GLOB 4.3*     No results for input(s): \"PH\", \"PCO2\", \"PO2\" in the last 72 hours.    Medications Personally Reviewed:    Current Facility-Administered Medications   Medication Dose Route Frequency    potassium chloride (KLOR-CON M) extended release tablet 40 mEq  40 mEq Oral Q6H    0.9% NaCl with KCl 40 mEq infusion   IntraVENous Continuous    sodium chloride flush 0.9 % injection 5-40 mL  5-40 mL IntraVENous 2 times per day    sodium chloride flush 0.9 % injection 5-40 mL  5-40 mL IntraVENous PRN    0.9 % sodium chloride infusion   IntraVENous PRN    heparin (porcine) injection 5,000 Units  5,000 Units SubCUTAneous BID    ondansetron (ZOFRAN-ODT) disintegrating tablet 4 mg  4 mg Oral Q8H PRN    Or    ondansetron (ZOFRAN) injection 4 mg  4

## 2024-10-09 NOTE — PROGRESS NOTES
Nuclear Medicine -  MPI completed @ 6230 today   Please check w/ DR regarding diet   Results pending

## 2024-10-09 NOTE — CARE COORDINATION
10/09/24 1545   Service Assessment   Patient Orientation Unable to Assess         Chart reviewed. Attempted to meet with pt at the bedside to introduce self and role with use of . Upon starting session, physician stepped in and CM deferred visit to allow physician to meet with pt. Will re-attempt visit in AM.    Per chart review, pt is here visiting family from [place]. Will continue to follow.    TRINA Rincon   Care Manager, [facility]  [phone]

## 2024-10-09 NOTE — PROGRESS NOTES
Hospitalist Progress Note    NAME:   Abe Duque   : 1945   MRN: [id number]     Date: 10/9/2024    Patient PCP: No primary care provider on file.    Hospital Problem list:     Severe hypokalemia K 1.6 POA finally improved to 4.4   Hypomagnesemia 1.5  Generalized weakness POA  Likely UTI POA   Left ureteral stent for 10 mm stone POA  Likely urethral stricture  Urine culture no growth, admit blood cultures remain negative  Procalcitonin 0.83  Chest x-ray on 10/8/2024 no acute process  Urinalysis with large leukocyte esterase large blood protein  WBC, no bacteria  We will get urine culture  Started patient on ceftriaxone  Continue current IV fluids  Bladder catheterization done in the ED with ruchi  Urology consulted-discussed with urology NP Yecenia-discussed with her about possible strictures.  Advised to get renal ultrasound.  Needs PT OT prior to discharge     Acute kidney injury POA admit creat 2.31  Stage 4 CKD POA baseline creat 1.6 to 2.0 per old records  Bilateral hydronephrosis on renal ultrasound POA  Possible urinary obstruction POA s/p parker placement 10/8/2024  Last known BMP was with creatinine 1.60 - 2.0 years ago at [place]  Renal US IMPRESSION:  Bilateral hydronephrosis of uncertain etiology, with neoplasia not excluded.  IVF and parker, renal function back to baseline  [facility] abdomen pelvis 10/9/2024 IMPRESSION:  1.  Moderate bilateral hydroureteronephrosis to the level of the UVJs. No  obstructing mass or stone.  2.  The urinary bladder is decompressed, but appears thick walled, which can be  seen with chronic obstruction or infection.   3.  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 The left common iliac  artery measures 2.5 cm and the left internal iliac artery measures 1.7 cm.  4.  Indeterminate 4 mm left lower lobe pulmonary nodule. In a low-risk patient,  no follow-up is required. In a high-risk patient, follow-up CT chest in 12  months is

## 2024-10-09 NOTE — PROGRESS NOTES
0845: Patient off unit for stress test.    0933: Patient returned from stress test. VSS    2003: End of Shift Note    Bedside shift change report given to AMY Mccurdy (oncoming nurse) by Janae Sanders RN (offgoing nurse).  Report included the following information SBAR, Intake/Output, MAR, and Recent Results    Shift worked:  8118-2964     Shift summary and any significant changes:     Patient tolerated scheduled medication this shift. Daughter at bedside this AM. No complaints of pain, SOB, or discomfort at shift change.      Concerns for physician to address:  N/a     [facility] phone for oncoming shift:   [phone]       Length of Stay:  Expected LOS: 5  Actual LOS: 0      Janae Sanders RN

## 2024-10-09 NOTE — CONSULTS
Requesting Provider: Clinton Tolbert Jr., MD - Reason for Consultation: \"urinary retention, difficult catheterization, hx ureteral stone\"  Pre-existing [facility] Patient:   No                Patient: Abe Duque MRN: [id number]  SSN: [id number]    YOB: 1945  Age: 79 y.o.  Sex: male     Location: [address] [place] [address]       Code Status: Full Code   PCP: No primary care provider on file.  - None   Emergency Contact:  Primary Emergency Contact: Germán Duque   Race/Mandaeism/Language: Chinese / None / Speaks Chinese (Mandarin)   Payor: Payor: [employer] / Plan: [employer] MEDICARE / Product Type: *No Product type* /    Prior Admission Data:         Hospitalized:  Hospital Day: 3 - Admitted 10/7/2024 11:55 PM   POD # * No surgery found *  by * Surgery not found * - Blood Loss: * No surgery found * * Surgery not found *     CONSULTANTS  IP CONSULT TO CARDIOLOGY  IP CONSULT TO UROLOGY   ADMISSION DIAGNOSES  [unfilled]      Assessment/Plan:       Acute urinary retention  JUNE - improved  Hx ureteric stone/kidney stone  - No urgent urological surgical intervention advised at this time.  - CT abdomen pelvis without contrast to evaluate for presence of stone and further evaluate bilateral hydronephrosis; following for results.  - Leave indwelling urethral Driscoll catheter and do not remove at this time.  - Trend creatinine  - Intake and output  - Following    D/w supervising MD, Dr. Otf Castillo       CC: [unfilled]   HPI: He is a 79 y.o. male who does not speak English who we are consulted to see due to findings of urinary retention as well as difficult catheterization and a history of ureteral stone reported.  He is new to our practice.  On my rounds he is being prepared to be transported to CT.  Patient is Mandarin speaking.  He came to the emergency department due to 1 week of progressive weakness.  He is from [place] and was here visiting family.  He has not been eating as well or drinking as well of this past week  57 14 --       [unfilled]   (2) ENMT:   moist mucous membranes, no nasal drainage or congestion    (3) Respiratory:  breathing easily, unlabored, no distress   (4) GI:  no abdominal masses, tenderness, or distension    (5) :   Emptying clear yellow urine into Driscoll catheter tubing and bag; no gross hematuria or blood clots; no CVA tenderness bilaterally.   (6) Lymphatic:  no adenopathy or edema   (7) Muscloskeletal:  no gross deformity, Normal ROM   (8) Skin:  no rash, warm, dry    (9) Neuro:  no focal deficits, Alert      Signed By: FARHANA KRISHNAN, APRN - NP  - October 9, 2024

## 2024-10-10 LAB
ALBUMIN SERPL-MCNC: 2.5 G/DL (ref 3.5–5)
ALBUMIN/GLOB SERPL: 0.6 (ref 1.1–2.2)
ALP SERPL-CCNC: 55 U/L (ref 45–117)
ALT SERPL-CCNC: 17 U/L (ref 12–78)
ANION GAP SERPL CALC-SCNC: 4 MMOL/L (ref 2–12)
AST SERPL-CCNC: 45 U/L (ref 15–37)
BASOPHILS # BLD: 0 K/UL (ref 0–0.1)
BASOPHILS NFR BLD: 0 % (ref 0–1)
BILIRUB SERPL-MCNC: 0.7 MG/DL (ref 0.2–1)
BUN SERPL-MCNC: 17 MG/DL (ref 6–20)
BUN/CREAT SERPL: 12 (ref 12–20)
CALCIUM SERPL-MCNC: 8.2 MG/DL (ref 8.5–10.1)
CHLORIDE SERPL-SCNC: 107 MMOL/L (ref 97–108)
CO2 SERPL-SCNC: 28 MMOL/L (ref 21–32)
CREAT SERPL-MCNC: 1.47 MG/DL (ref 0.7–1.3)
DIFFERENTIAL METHOD BLD: ABNORMAL
EOSINOPHIL # BLD: 0.1 K/UL (ref 0–0.4)
EOSINOPHIL NFR BLD: 1 % (ref 0–7)
ERYTHROCYTE [DISTWIDTH] IN BLOOD BY AUTOMATED COUNT: 14 % (ref 11.5–14.5)
GLOBULIN SER CALC-MCNC: 4 G/DL (ref 2–4)
GLUCOSE SERPL-MCNC: 114 MG/DL (ref 65–100)
HCT VFR BLD AUTO: 32.4 % (ref 36.6–50.3)
HGB BLD-MCNC: 11.1 G/DL (ref 12.1–17)
IMM GRANULOCYTES # BLD AUTO: 0 K/UL (ref 0–0.04)
IMM GRANULOCYTES NFR BLD AUTO: 0 % (ref 0–0.5)
LYMPHOCYTES # BLD: 1.6 K/UL (ref 0.8–3.5)
LYMPHOCYTES NFR BLD: 21 % (ref 12–49)
MAGNESIUM SERPL-MCNC: 1.8 MG/DL (ref 1.6–2.4)
MCH RBC QN AUTO: 31.7 PG (ref 26–34)
MCHC RBC AUTO-ENTMCNC: 34.3 G/DL (ref 30–36.5)
MCV RBC AUTO: 92.6 FL (ref 80–99)
MONOCYTES # BLD: 0.6 K/UL (ref 0–1)
MONOCYTES NFR BLD: 7 % (ref 5–13)
NEUTS SEG # BLD: 5.5 K/UL (ref 1.8–8)
NEUTS SEG NFR BLD: 71 % (ref 32–75)
NRBC # BLD: 0 K/UL (ref 0–0.01)
NRBC BLD-RTO: 0 PER 100 WBC
PLATELET # BLD AUTO: 231 K/UL (ref 150–400)
PMV BLD AUTO: 9.5 FL (ref 8.9–12.9)
POTASSIUM SERPL-SCNC: 3.1 MMOL/L (ref 3.5–5.1)
PROCALCITONIN SERPL-MCNC: 0.24 NG/ML
PROT SERPL-MCNC: 6.5 G/DL (ref 6.4–8.2)
RBC # BLD AUTO: 3.5 M/UL (ref 4.1–5.7)
SODIUM SERPL-SCNC: 139 MMOL/L (ref 136–145)
TROPONIN I SERPL HS-MCNC: 219 NG/L (ref 0–76)
WBC # BLD AUTO: 7.9 K/UL (ref 4.1–11.1)

## 2024-10-10 PROCEDURE — 96372 THER/PROPH/DIAG INJ SC/IM: CPT

## 2024-10-10 PROCEDURE — 85025 COMPLETE CBC W/AUTO DIFF WBC: CPT

## 2024-10-10 PROCEDURE — 80053 COMPREHEN METABOLIC PANEL: CPT

## 2024-10-10 PROCEDURE — 84145 PROCALCITONIN (PCT): CPT

## 2024-10-10 PROCEDURE — 96376 TX/PRO/DX INJ SAME DRUG ADON: CPT

## 2024-10-10 PROCEDURE — 2580000003 HC RX 258: Performed by: STUDENT IN AN ORGANIZED HEALTH CARE EDUCATION/TRAINING PROGRAM

## 2024-10-10 PROCEDURE — 36415 COLL VENOUS BLD VENIPUNCTURE: CPT

## 2024-10-10 PROCEDURE — 2500000003 HC RX 250 WO HCPCS: Performed by: STUDENT IN AN ORGANIZED HEALTH CARE EDUCATION/TRAINING PROGRAM

## 2024-10-10 PROCEDURE — 84484 ASSAY OF TROPONIN QUANT: CPT

## 2024-10-10 PROCEDURE — 6360000002 HC RX W HCPCS: Performed by: STUDENT IN AN ORGANIZED HEALTH CARE EDUCATION/TRAINING PROGRAM

## 2024-10-10 PROCEDURE — 97165 OT EVAL LOW COMPLEX 30 MIN: CPT

## 2024-10-10 PROCEDURE — G0378 HOSPITAL OBSERVATION PER HR: HCPCS

## 2024-10-10 PROCEDURE — 51702 INSERT TEMP BLADDER CATH: CPT

## 2024-10-10 PROCEDURE — 6370000000 HC RX 637 (ALT 250 FOR IP): Performed by: NURSE PRACTITIONER

## 2024-10-10 PROCEDURE — 6370000000 HC RX 637 (ALT 250 FOR IP): Performed by: INTERNAL MEDICINE

## 2024-10-10 PROCEDURE — 97535 SELF CARE MNGMENT TRAINING: CPT

## 2024-10-10 PROCEDURE — 83735 ASSAY OF MAGNESIUM: CPT

## 2024-10-10 PROCEDURE — 6370000000 HC RX 637 (ALT 250 FOR IP): Performed by: STUDENT IN AN ORGANIZED HEALTH CARE EDUCATION/TRAINING PROGRAM

## 2024-10-10 RX ORDER — POTASSIUM CHLORIDE 1500 MG/1
40 TABLET, EXTENDED RELEASE ORAL EVERY 6 HOURS
Status: COMPLETED | OUTPATIENT
Start: 2024-10-10 | End: 2024-10-10

## 2024-10-10 RX ADMIN — POTASSIUM CHLORIDE 40 MEQ: 1500 TABLET, EXTENDED RELEASE ORAL at 17:07

## 2024-10-10 RX ADMIN — SODIUM CHLORIDE, PRESERVATIVE FREE 10 ML: 5 INJECTION INTRAVENOUS at 21:58

## 2024-10-10 RX ADMIN — ASPIRIN 81 MG: 81 TABLET, CHEWABLE ORAL at 09:26

## 2024-10-10 RX ADMIN — AMLODIPINE BESYLATE 10 MG: 5 TABLET ORAL at 09:26

## 2024-10-10 RX ADMIN — SODIUM CHLORIDE, PRESERVATIVE FREE 10 ML: 5 INJECTION INTRAVENOUS at 09:27

## 2024-10-10 RX ADMIN — ATORVASTATIN CALCIUM 10 MG: 10 TABLET, FILM COATED ORAL at 21:53

## 2024-10-10 RX ADMIN — HEPARIN SODIUM 5000 UNITS: 5000 INJECTION INTRAVENOUS; SUBCUTANEOUS at 21:38

## 2024-10-10 RX ADMIN — HYDROMORPHONE HYDROCHLORIDE 0.25 MG: 1 INJECTION, SOLUTION INTRAMUSCULAR; INTRAVENOUS; SUBCUTANEOUS at 01:47

## 2024-10-10 RX ADMIN — POTASSIUM CHLORIDE 40 MEQ: 1500 TABLET, EXTENDED RELEASE ORAL at 10:43

## 2024-10-10 RX ADMIN — SODIUM CHLORIDE, PRESERVATIVE FREE 10 ML: 5 INJECTION INTRAVENOUS at 01:47

## 2024-10-10 RX ADMIN — HEPARIN SODIUM 5000 UNITS: 5000 INJECTION INTRAVENOUS; SUBCUTANEOUS at 09:26

## 2024-10-10 ASSESSMENT — PAIN SCALES - GENERAL
PAINLEVEL_OUTOF10: 7
PAINLEVEL_OUTOF10: 0
PAINLEVEL_OUTOF10: 0

## 2024-10-10 ASSESSMENT — PAIN DESCRIPTION - DESCRIPTORS: DESCRIPTORS: ACHING

## 2024-10-10 ASSESSMENT — PAIN DESCRIPTION - ORIENTATION: ORIENTATION: RIGHT

## 2024-10-10 ASSESSMENT — PAIN DESCRIPTION - LOCATION: LOCATION: LEG

## 2024-10-10 NOTE — PLAN OF CARE
Problem: Occupational Therapy - Adult  Goal: By Discharge: Performs self-care activities at highest level of function for planned discharge setting.  See evaluation for individualized goals.  Description: FUNCTIONAL STATUS PRIOR TO ADMISSION:  Independent with ADLs and functional mobility without AD, occasionally uses cane PRN. Lives in [place] with wife and dtr, but is currently visiting [place] and staying with dtr and grandson  Receives Help From: Family, ADL Assistance: Independent,  ,  ,  ,  ,  ,  , Ambulation Assistance: Independent, Transfer Assistance: Independent,       HOME SUPPORT: Patient has been staying with dtr and grandson in [place]. Has another dtr that lives locally and can assist with care of pt upon return to his other dtr's home.     Occupational Therapy Goals:  Initiated 10/10/2024  1.  Patient will perform grooming in stance  with Stand by Assist within 7 day(s).  2.  Patient will perform lower body dressing with Contact Guard Assist within 7 day(s).  3.  Patient will perform supine<>sit with [place] within 7 day(s).  4.  Patient will perform toilet transfers with Stand by Assist  within 7 day(s).  5.  Patient will perform all aspects of toileting with Stand by Assist within 7 day(s).  6.  Patient will complete functional ambulation with least restrictive AD within 7 day(s).        Outcome: Progressing   OCCUPATIONAL THERAPY EVALUATION    Patient: Abe Duque (79 y.o. male)  Date: 10/10/2024  Primary Diagnosis: Dehydration [E86.0]  Hypokalemia [E87.6]  Acute hypokalemia [E87.6]  Heart murmur [R01.1]  JUNE (acute kidney injury) (HCC) [N17.9]  Abnormal electrocardiography [R94.31]         Precautions: Fall Risk                  ASSESSMENT :  The patient is limited by decreased functional mobility, independence in ADLs, high-level IADLs, ROM, strength, activity tolerance, endurance, attention/concentration, coordination, balance, posture. Pt admitted with GW x1 week and decreased urine output,  Role of Therapy;Transfer Training;Plan of Care;Fall Prevention Strategies;Mobility Training;Equipment  Education Method: Verbal;  Barriers to Learning: None  Education Outcome: Verbalized understanding;Demonstrated understanding;Continued education needed    Thank you for this referral.  Allie Olmos OT  Minutes: 45    Occupational Therapy Evaluation Charge Determination   History Examination Decision-Making   LOW Complexity : Brief history review  LOW Complexity: 1-3 Performance deficits relating to physical, cognitive, or psychosocial skills that result in activity limitations and/or participation restrictions LOW Complexity: No comorbidities that affect functional and  no verbal  or physical assist needed to complete eval tasks   Based on the above components, the patient evaluation is determined to be of the following complexity level: Low

## 2024-10-10 NOTE — PROGRESS NOTES
End of Shift Note    Bedside shift change report given to AMY Joseph (oncoming nurse) by RENALDO BILLINGSLEY RN (offgoing nurse).  Report included the following information SBAR REPORTS LIST: SBAR, Intake/Output, MAR, Recent Results, and Cardiac Rhythm Sinus savana    Shift worked:  1110-9589     Shift summary and any significant changes:     Patient had episodes if confusion overnight. Pulling tele leads, gown, and parker securement device out. Iv potassium was stopped. Patient was calm at shift change. He is on room air , has not gotten out of bed over night.     Concerns for physician to address:       Zone phone for oncoming shift:          RENALDO BILLINGSLEY RN

## 2024-10-10 NOTE — PROGRESS NOTES
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  Prominent inferolateral infarction.  LVEF 45%.     Plan:     Continue aspirin  Continue Lipitor 10 mg daily  Continue amlodipine 10 mg daily     Discussed with patient regarding result of stress test which shows prominent inferolateral infarction.  He denied any history of heart attack.  Denied any previous left heart catheterization.  We have discussed about left heart catheterization to evaluate coronary anatomy.  Discussed risk and benefit. However, he does not want to proceed with procedure.  Since he is asymptomatic and no reversible ischemia that is reasonable.  I have discussed with him about needing medical therapy for CAD and follow-up with cardiology as outpatient and consider this in future.     Echocardiogram is pending.        Thank you for this consult and allowing me to take part in this patients care.  Please call with questions.    Subjective:     HPI:     No CP or SOB   Echo pending     Objective:      Physical Exam:    Last 24hrs VS reviewed since prior progress note. Most recent are:    BP (!) 111/59   Pulse 58   Temp 98.2 °F (36.8 °C) (Oral)   Resp 16   Ht 1.524 m (5')   Wt 50.3 kg (111 lb)   SpO2 94%   BMI 21.68 kg/m²     Intake/Output Summary (Last 24 hours) at 10/10/2024

## 2024-10-10 NOTE — PROGRESS NOTES
Progress Note    Patient: Abe Duque MRN: [id number]  SSN: [id number]    YOB: 1945  Age: 79 y.o.  Sex: male          ADMITTED:  10/7/2024 to Clinton Tolbert Jr., MD  for Dehydration [E86.0]  Hypokalemia [E87.6]  Acute hypokalemia [E87.6]  Heart murmur [R01.1]  DAQUAN (acute kidney injury) (HCC) [N17.9]  Abnormal electrocardiography [R94.31]           Abe Duque was admitted for Dehydration [E86.0]  Hypokalemia [E87.6]  Acute hypokalemia [E87.6]  Heart murmur [R01.1]  DAQUAN (acute kidney injury) (HCC) [N17.9]  Abnormal electrocardiography [R94.31].    Urology follow up for bilateral hydronephrosis and DAQUAN in setting of AUR. Daquan improving with parker. Continues good UOP. Denies flank pain. Exam limited due to nonenglish speaking   UA on admission  wbcs however urine cx resulted NG. He was given empiric abx       Vitals:  Temp (24hrs), Av.1 °F (36.7 °C), Min:97.9 °F (36.6 °C), Max:98.2 °F (36.8 °C)     Blood pressure 126/69, pulse 57, temperature 98.2 °F (36.8 °C), temperature source Oral, resp. rate 14, height 1.524 m (5'), weight 50.3 kg (111 lb), SpO2 94%.      I&O's:  10/08 1901 - 10/10 0700  In: 1000 [P.O.:1000]  Out: 4600 [Urine:4600]   10/10 0701 - 10/10 1900  In: -   Out: 1000 [Urine:1000]     Exam:   NAD. abdomen soft, NT     Labs:   Recent Labs     10/08/24  0214 10/09/24  0511 10/10/24  0434   WBC 10.5 9.2 7.9   HGB 12.8 12.4 11.1*   HCT 36.5* 36.5* 32.4*    247 231     Recent Labs     10/09/24  0511 10/09/24  1853 10/10/24  0434    142 139   K 2.5* 4.4 3.1*    109* 107   CO2 27 28 28   BUN 22* 20 17        Cultures:        Imaging:       Assessment:     - Principal Problem:    Hypokalemia  Resolved Problems:    * No resolved hospital problems. *    Bilateral hydronephrosis in setting of AUR/bladder wall thickening now s/p parker     DAQUAN    Hx urethral stricture     Plan:     - CT a/p wo showing no obstructing  source,

## 2024-10-10 NOTE — PROGRESS NOTES
1400: This nurse noticed patient's urine color is pink now. It was yellow when it was emptied at 1100. Notified Dr. Tolbert. No new order received.    1500: End of Shift Note    Bedside shift change report given to Ed (oncoming nurse) by Yeong AE Jenny, RN (offgoing nurse).  Report included the following information SBAR    Shift worked:  7a - 3p     Shift summary and any significant changes:     Pt seen by urology. Worked with OT. Echo and renal US pending.     Concerns for physician to address:  Pt's urine color is pink      Zone phone for oncoming shift:          Activity:     Number times ambulated in hallways past shift: 0  Number of times OOB to chair past shift: 0    Cardiac:   Cardiac Monitoring: Yes           Access:  Current line(s): PIV     Genitourinary:   Urinary status: parker    Respiratory:      Chronic home O2 use?: NO  Incentive spirometer at bedside: N/A       GI:     Current diet:  ADULT DIET; Regular  Passing flatus: YES  Tolerating current diet: YES       Pain Management:   Patient states pain is manageable on current regimen: YES    Skin:     Interventions: float heels, PT/OT consult, and internal/external urinary devices    Patient Safety:  Fall Score:    Interventions: bed/chair alarm, assistive device (walker, cane. etc), gripper socks, pt to call before getting OOB, and stay with me (per policy)       Length of Stay:  Expected LOS: 4  Actual LOS: 0      Yeong AE Jenny, RN

## 2024-10-10 NOTE — PROGRESS NOTES
..End of Shift Note    Bedside shift change report given to AMY Cummings (oncoming nurse) by Toni Potter RN (offgoing nurse).  Report included the following information SBAR    Shift worked: 1500 - 1900     Shift summary and any significant changes:    Pt. Tolerated all medication w/o issue.  No c/o pain or distress.      Concerns for physician to address:  No     Zone phone for oncoming shift:  No       Activity:     Number times ambulated in hallways past shift: 0  Number of times OOB to chair past shift: 0    Cardiac:   Cardiac Monitoring: Yes           Access:  Current line(s): PIV     Genitourinary:   Urinary status: parker    Respiratory:      Chronic home O2 use?: NO  Incentive spirometer at bedside: NO       GI:     Current diet:  ADULT DIET; Regular  Passing flatus: YES  Tolerating current diet: YES       Pain Management:   Patient states pain is manageable on current regimen: YES    Skin:     Interventions: float heels, increase time out of bed, and internal/external urinary devices    Patient Safety:  Fall Score:    Interventions: gripper socks and pt to call before getting OOB       Length of Stay:  Expected LOS: 4  Actual LOS: 0      Toni Potter RN

## 2024-10-11 ENCOUNTER — APPOINTMENT (OUTPATIENT)
Facility: HOSPITAL | Age: 79
End: 2024-10-11
Payer: COMMERCIAL

## 2024-10-11 LAB
ANION GAP SERPL CALC-SCNC: 4 MMOL/L (ref 2–12)
BASOPHILS # BLD: 0 K/UL (ref 0–0.1)
BASOPHILS NFR BLD: 1 % (ref 0–1)
BUN SERPL-MCNC: 23 MG/DL (ref 6–20)
BUN/CREAT SERPL: 16 (ref 12–20)
CALCIUM SERPL-MCNC: 8.5 MG/DL (ref 8.5–10.1)
CHLORIDE SERPL-SCNC: 108 MMOL/L (ref 97–108)
CO2 SERPL-SCNC: 27 MMOL/L (ref 21–32)
CREAT SERPL-MCNC: 1.47 MG/DL (ref 0.7–1.3)
D DIMER PPP FEU-MCNC: 1.2 MG/L FEU (ref 0–0.65)
DIFFERENTIAL METHOD BLD: ABNORMAL
ECHO AR MAX VEL PISA: 3.7 M/S
ECHO AV AREA PEAK VELOCITY: 3.2 CM2
ECHO AV AREA VTI: 2.8 CM2
ECHO AV AREA/BSA VTI: 1.9 CM2/M2
ECHO AV MEAN GRADIENT: 4 MMHG
ECHO AV MEAN VELOCITY: 0.9 M/S
ECHO AV PEAK GRADIENT: 5 MMHG
ECHO AV PEAK GRADIENT: 5 MMHG
ECHO AV PEAK VELOCITY: 1.1 M/S
ECHO AV PEAK VELOCITY: 1.2 M/S
ECHO AV REGURGITANT PHT: 633.7 MILLISECOND
ECHO AV VTI: 19.1 CM
ECHO BSA: 1.46 M2
ECHO LA VOL A-L A2C: 64 ML (ref 18–58)
ECHO LA VOL A-L A4C: 87 ML (ref 18–58)
ECHO LA VOL MOD A2C: 60 ML (ref 18–58)
ECHO LA VOL MOD A4C: 76 ML (ref 18–58)
ECHO LA VOLUME AREA LENGTH: 76 ML
ECHO LA VOLUME INDEX A-L A2C: 44 ML/M2 (ref 16–34)
ECHO LA VOLUME INDEX A-L A4C: 60 ML/M2 (ref 16–34)
ECHO LA VOLUME INDEX AREA LENGTH: 52 ML/M2 (ref 16–34)
ECHO LA VOLUME INDEX MOD A2C: 41 ML/M2 (ref 16–34)
ECHO LA VOLUME INDEX MOD A4C: 52 ML/M2 (ref 16–34)
ECHO LV E' LATERAL VELOCITY: 4.9 CM/S
ECHO LV E' SEPTAL VELOCITY: 3.4 CM/S
ECHO LV EDV A2C: 141 ML
ECHO LV EDV A4C: 148 ML
ECHO LV EDV BP: 147 ML (ref 67–155)
ECHO LV EDV INDEX A4C: 102 ML/M2
ECHO LV EDV INDEX BP: 101 ML/M2
ECHO LV EDV NDEX A2C: 97 ML/M2
ECHO LV EJECTION FRACTION A2C: 52 %
ECHO LV EJECTION FRACTION A4C: 60 %
ECHO LV EJECTION FRACTION BIPLANE: 56 % (ref 55–100)
ECHO LV ESV A2C: 67 ML
ECHO LV ESV A4C: 60 ML
ECHO LV ESV BP: 65 ML (ref 22–58)
ECHO LV ESV INDEX A2C: 46 ML/M2
ECHO LV ESV INDEX A4C: 41 ML/M2
ECHO LV ESV INDEX BP: 45 ML/M2
ECHO LVOT AREA: 4.2 CM2
ECHO LVOT AV VTI INDEX: 0.69
ECHO LVOT DIAM: 2.3 CM
ECHO LVOT MEAN GRADIENT: 2 MMHG
ECHO LVOT PEAK GRADIENT: 3 MMHG
ECHO LVOT PEAK GRADIENT: 3 MMHG
ECHO LVOT PEAK VELOCITY: 0.9 M/S
ECHO LVOT PEAK VELOCITY: 0.9 M/S
ECHO LVOT STROKE VOLUME INDEX: 37.5 ML/M2
ECHO LVOT SV: 54.4 ML
ECHO LVOT VTI: 13.1 CM
ECHO MV A VELOCITY: 0.86 M/S
ECHO MV AREA VTI: 2 CM2
ECHO MV E DECELERATION TIME (DT): 304.6 MS
ECHO MV E VELOCITY: 0.39 M/S
ECHO MV E/A RATIO: 0.45
ECHO MV E/E' LATERAL: 7.96
ECHO MV E/E' RATIO (AVERAGED): 9.71
ECHO MV E/E' SEPTAL: 11.47
ECHO MV LVOT VTI INDEX: 2.04
ECHO MV MAX VELOCITY: 1 M/S
ECHO MV MEAN GRADIENT: 1 MMHG
ECHO MV MEAN VELOCITY: 0.5 M/S
ECHO MV PEAK GRADIENT: 4 MMHG
ECHO MV REGURGITANT PEAK GRADIENT: 64 MMHG
ECHO MV REGURGITANT PEAK VELOCITY: 4 M/S
ECHO MV REGURGITANT VTIA: 127.1 CM
ECHO MV VTI: 26.7 CM
ECHO PULMONARY ARTERY END DIASTOLIC PRESSURE: 5 MMHG
ECHO PV MAX VELOCITY: 0.8 M/S
ECHO PV PEAK GRADIENT: 2 MMHG
ECHO PV REGURGITANT MAX VELOCITY: 1.1 M/S
ECHO RV INTERNAL DIMENSION: 4.3 CM
ECHO TV REGURGITANT MAX VELOCITY: 1.89 M/S
ECHO TV REGURGITANT PEAK GRADIENT: 14 MMHG
EOSINOPHIL # BLD: 0.1 K/UL (ref 0–0.4)
EOSINOPHIL NFR BLD: 2 % (ref 0–7)
ERYTHROCYTE [DISTWIDTH] IN BLOOD BY AUTOMATED COUNT: 13.9 % (ref 11.5–14.5)
GLUCOSE SERPL-MCNC: 108 MG/DL (ref 65–100)
HCT VFR BLD AUTO: 34.3 % (ref 36.6–50.3)
HGB BLD-MCNC: 11.9 G/DL (ref 12.1–17)
IMM GRANULOCYTES # BLD AUTO: 0 K/UL (ref 0–0.04)
IMM GRANULOCYTES NFR BLD AUTO: 0 % (ref 0–0.5)
LYMPHOCYTES # BLD: 1.7 K/UL (ref 0.8–3.5)
LYMPHOCYTES NFR BLD: 25 % (ref 12–49)
MAGNESIUM SERPL-MCNC: 1.6 MG/DL (ref 1.6–2.4)
MCH RBC QN AUTO: 31.6 PG (ref 26–34)
MCHC RBC AUTO-ENTMCNC: 34.7 G/DL (ref 30–36.5)
MCV RBC AUTO: 91.2 FL (ref 80–99)
MONOCYTES # BLD: 0.5 K/UL (ref 0–1)
MONOCYTES NFR BLD: 7 % (ref 5–13)
NEUTS SEG # BLD: 4.4 K/UL (ref 1.8–8)
NEUTS SEG NFR BLD: 65 % (ref 32–75)
NRBC # BLD: 0 K/UL (ref 0–0.01)
NRBC BLD-RTO: 0 PER 100 WBC
PLATELET # BLD AUTO: 242 K/UL (ref 150–400)
PMV BLD AUTO: 9.3 FL (ref 8.9–12.9)
POTASSIUM SERPL-SCNC: 3.9 MMOL/L (ref 3.5–5.1)
RBC # BLD AUTO: 3.76 M/UL (ref 4.1–5.7)
SODIUM SERPL-SCNC: 139 MMOL/L (ref 136–145)
WBC # BLD AUTO: 6.7 K/UL (ref 4.1–11.1)

## 2024-10-11 PROCEDURE — 80048 BASIC METABOLIC PNL TOTAL CA: CPT

## 2024-10-11 PROCEDURE — G0378 HOSPITAL OBSERVATION PER HR: HCPCS

## 2024-10-11 PROCEDURE — 93306 TTE W/DOPPLER COMPLETE: CPT

## 2024-10-11 PROCEDURE — 96372 THER/PROPH/DIAG INJ SC/IM: CPT

## 2024-10-11 PROCEDURE — 6360000002 HC RX W HCPCS: Performed by: STUDENT IN AN ORGANIZED HEALTH CARE EDUCATION/TRAINING PROGRAM

## 2024-10-11 PROCEDURE — 97116 GAIT TRAINING THERAPY: CPT | Performed by: PHYSICAL THERAPIST

## 2024-10-11 PROCEDURE — 76770 US EXAM ABDO BACK WALL COMP: CPT

## 2024-10-11 PROCEDURE — 97161 PT EVAL LOW COMPLEX 20 MIN: CPT | Performed by: PHYSICAL THERAPIST

## 2024-10-11 PROCEDURE — 6370000000 HC RX 637 (ALT 250 FOR IP): Performed by: INTERNAL MEDICINE

## 2024-10-11 PROCEDURE — 6370000000 HC RX 637 (ALT 250 FOR IP): Performed by: NURSE PRACTITIONER

## 2024-10-11 PROCEDURE — 78580 LUNG PERFUSION IMAGING: CPT

## 2024-10-11 PROCEDURE — 83735 ASSAY OF MAGNESIUM: CPT

## 2024-10-11 PROCEDURE — 3430000000 HC RX DIAGNOSTIC RADIOPHARMACEUTICAL: Performed by: INTERNAL MEDICINE

## 2024-10-11 PROCEDURE — 6360000002 HC RX W HCPCS: Performed by: INTERNAL MEDICINE

## 2024-10-11 PROCEDURE — 97530 THERAPEUTIC ACTIVITIES: CPT | Performed by: PHYSICAL THERAPIST

## 2024-10-11 PROCEDURE — 85379 FIBRIN DEGRADATION QUANT: CPT

## 2024-10-11 PROCEDURE — 6370000000 HC RX 637 (ALT 250 FOR IP): Performed by: STUDENT IN AN ORGANIZED HEALTH CARE EDUCATION/TRAINING PROGRAM

## 2024-10-11 PROCEDURE — 36415 COLL VENOUS BLD VENIPUNCTURE: CPT

## 2024-10-11 PROCEDURE — 96366 THER/PROPH/DIAG IV INF ADDON: CPT

## 2024-10-11 PROCEDURE — 2580000003 HC RX 258: Performed by: STUDENT IN AN ORGANIZED HEALTH CARE EDUCATION/TRAINING PROGRAM

## 2024-10-11 PROCEDURE — 85025 COMPLETE CBC W/AUTO DIFF WBC: CPT

## 2024-10-11 PROCEDURE — A9540 TC99M MAA: HCPCS | Performed by: INTERNAL MEDICINE

## 2024-10-11 PROCEDURE — 73562 X-RAY EXAM OF KNEE 3: CPT

## 2024-10-11 RX ORDER — AMLODIPINE BESYLATE 5 MG/1
5 TABLET ORAL DAILY
Status: DISCONTINUED | OUTPATIENT
Start: 2024-10-12 | End: 2024-10-12 | Stop reason: HOSPADM

## 2024-10-11 RX ORDER — MAGNESIUM SULFATE IN WATER 40 MG/ML
2000 INJECTION, SOLUTION INTRAVENOUS ONCE
Status: COMPLETED | OUTPATIENT
Start: 2024-10-11 | End: 2024-10-12

## 2024-10-11 RX ORDER — POTASSIUM CHLORIDE 1500 MG/1
40 TABLET, EXTENDED RELEASE ORAL ONCE
Status: COMPLETED | OUTPATIENT
Start: 2024-10-11 | End: 2024-10-11

## 2024-10-11 RX ORDER — ATORVASTATIN CALCIUM 20 MG/1
20 TABLET, FILM COATED ORAL NIGHTLY
Status: DISCONTINUED | OUTPATIENT
Start: 2024-10-11 | End: 2024-10-12 | Stop reason: HOSPADM

## 2024-10-11 RX ORDER — ACETAMINOPHEN 325 MG/1
650 TABLET ORAL EVERY 8 HOURS SCHEDULED
Status: DISCONTINUED | OUTPATIENT
Start: 2024-10-11 | End: 2024-10-12 | Stop reason: HOSPADM

## 2024-10-11 RX ORDER — ACETAMINOPHEN 650 MG/1
650 SUPPOSITORY RECTAL EVERY 8 HOURS SCHEDULED
Status: DISCONTINUED | OUTPATIENT
Start: 2024-10-11 | End: 2024-10-12 | Stop reason: HOSPADM

## 2024-10-11 RX ADMIN — HEPARIN SODIUM 5000 UNITS: 5000 INJECTION INTRAVENOUS; SUBCUTANEOUS at 09:10

## 2024-10-11 RX ADMIN — POTASSIUM CHLORIDE 40 MEQ: 1500 TABLET, EXTENDED RELEASE ORAL at 21:07

## 2024-10-11 RX ADMIN — SODIUM CHLORIDE, PRESERVATIVE FREE 10 ML: 5 INJECTION INTRAVENOUS at 21:12

## 2024-10-11 RX ADMIN — MAGNESIUM SULFATE HEPTAHYDRATE 2000 MG: 40 INJECTION, SOLUTION INTRAVENOUS at 21:05

## 2024-10-11 RX ADMIN — HEPARIN SODIUM 5000 UNITS: 5000 INJECTION INTRAVENOUS; SUBCUTANEOUS at 21:11

## 2024-10-11 RX ADMIN — AMLODIPINE BESYLATE 10 MG: 5 TABLET ORAL at 09:09

## 2024-10-11 RX ADMIN — ACETAMINOPHEN 650 MG: 325 TABLET ORAL at 21:48

## 2024-10-11 RX ADMIN — ATORVASTATIN CALCIUM 20 MG: 20 TABLET, FILM COATED ORAL at 21:07

## 2024-10-11 RX ADMIN — ASPIRIN 81 MG: 81 TABLET, CHEWABLE ORAL at 09:09

## 2024-10-11 RX ADMIN — KIT FOR THE PREPARATION OF TECHNETIUM TC 99M ALBUMIN AGGREGATED 4.2 MILLICURIE: 2.5 INJECTION, POWDER, FOR SOLUTION INTRAVENOUS at 13:55

## 2024-10-11 ASSESSMENT — ENCOUNTER SYMPTOMS
DIARRHEA: 0
BACK PAIN: 0
COUGH: 0
ABDOMINAL PAIN: 0
NAUSEA: 0
VOMITING: 0
SHORTNESS OF BREATH: 0

## 2024-10-11 ASSESSMENT — PAIN SCALES - GENERAL: PAINLEVEL_OUTOF10: 0

## 2024-10-11 NOTE — PROGRESS NOTES
NEW PATIENT PCP hospital follow-up transitional care appointment has been scheduled with Dr. Darlene Galvan on 10/30/24 1000. [facility] confirmed that the PCP office has language services available for the appt. Patient's new physical address: [address] [address]. [facility] placed [facility] information AVS for patient resource.   Pending patient discharge.  Cristina Sun, Care Management Assistant

## 2024-10-11 NOTE — PROGRESS NOTES
Hospitalist Progress Note    NAME:   Abe Duque   : 1945   MRN: [id number]     Date: 10/10/2024    Patient PCP: No primary care provider on file.    Hospital Problem list:     Severe hypokalemia K 1.6 POA finally improved > 3.3  Hypomagnesemia 1.5 improved  Generalized weakness POA  Left ureteral stent for 10 mm stone POA  Likely urethral stricture  UTI ruled out  Procalcitonin 0.83  Chest x-ray on 10/8/2024 no acute process  Urinalysis with large leukocyte esterase large blood protein  WBC, no bacteria   Urine culture no growth, admit blood cultures remain negative  Ceftriaxone x 2 days, stopped with negative cultures  S/pa IVF  Po KCL  Urology consulted = parker to remain in place till outpatient follow up  Needs PT OT prior to discharge     Acute kidney injury POA admit creat 2.31 --> 1.47  Stage 4 CKD POA baseline creat 1.6 to 2.0 per old records  Bilateral hydronephrosis on renal ultrasound POA s/p parker  Possible urinary obstruction s/p parker placement 10/8/2024  Last known BMP was with creatinine 1.60 - 2.0 years ago at [facility]  Renal US IMPRESSION:  Bilateral hydronephrosis of uncertain etiology, with neoplasia not excluded.  IVF and parker, renal function back to baseline  Seen by [facility]  CT scan abdomen pelvis 10/9/2024 IMPRESSION:  1.  Moderate bilateral hydroureteronephrosis to the level of the UVJs. No  obstructing mass or stone.  2.  The urinary bladder is decompressed, but appears thick walled, which can be  seen with chronic obstruction or infection.   3.  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 The left common iliac  artery measures 2.5 cm and the left internal iliac artery measures 1.7 cm.  4.  Indeterminate 4 mm left lower lobe pulmonary nodule. In a low-risk patient,  no follow-up is required. In a high-risk patient, follow-up CT chest in 12  months is optional per Fleischner guidelines.  Check renal US  Parker till outpatient follow up     Troponin elevation  edema  GI:  Soft, Non distended, Non tender.  +Bowel sounds  Neurologic:  Alert and oriented X 3, normal speech,   Psych:   Not anxious nor agitated  Skin:  No rashes.  No jaundice    Reviewed most current lab test results and cultures  YES  Reviewed most current radiology test results   YES  Review and summation of old records today    NO  Reviewed patient's current orders and MAR    YES  PMH/SH reviewed - no change compared to H&P    ________________________________________________________________________        Comments   >50% of visit spent in counseling and coordination of care     ________________________________________________________________________  Clinton Tolbert Jr, MD     Procedures: see electronic medical records for all procedures/Xrays and details which were not copied into this note but were reviewed prior to creation of Plan.      LABS:  I reviewed today's most current labs and imaging studies.  Pertinent labs include:  Recent Labs     10/08/24  0214 10/09/24  0511 10/10/24  0434   WBC 10.5 9.2 7.9   HGB 12.8 12.4 11.1*   HCT 36.5* 36.5* 32.4*    247 231     Recent Labs     10/08/24  0214 10/08/24  0332 10/08/24  1542 10/09/24  0511 10/09/24  1853 10/10/24  0434     --   --  140 142 139   K 1.6*  --  2.3* 2.5* 4.4 3.1*     --   --  104 109* 107   CO2 28  --   --  27 28 28   GLUCOSE 124*  --   --  101* 121* 114*   BUN 35*  --   --  22* 20 17   CREATININE 2.31*  --   --  1.71* 1.61* 1.47*   CALCIUM 8.7  --   --  8.1* 8.1* 8.2*   MG  --  1.7  --  1.5*  --  1.8   BILITOT 0.8  --   --   --   --  0.7   AST 43*  --   --   --   --  45*   ALT 14  --   --   --   --  17     Invalid input(s): \"CK\", \"TROPONIN\", \"PBNP\"    Signed: Clinton Tolbert Jr, MD

## 2024-10-11 NOTE — CARE COORDINATION
Care Management Initial Assessment       RUR: N/A  Readmission? No  1st IM letter given? No  1st  letter given: No     Chart reviewed. Acknowledged CM consult for assistance with follow up appointments. CM spoke with pt's grandson (Germán) by phone today. Verified contact information and demographics. Pt is currently in town visiting family from [place], [place]. He has been in town for about a week or so. Family has decided that pt will remain in the [facility] post discharge and receive follow up care here. Discharge address is [address] [place] [address]. Pt is typically independent with ADLs and ambulatory with a cane or rollator as needed. Family can assist with IADLs and transportation. Preferred pharmacy for d/c is [facility]. Family will transport pt home upon d/c.    CM working to seek new PCP appointment at this time. Secured follow up with cardiology- will see Dr. Estevez on 10/29 @ 1:30PM. See AVS for details.     Awaiting further PT/OT recommendations for d/c planning. Will continue to follow.         10/11/24 8683   Service Assessment   Patient Orientation Alert and Oriented   Cognition Alert   History Provided By Child/Family;Medical Record  (grandson)   Primary Caregiver Self   Support Systems Children;Family Members   Patient's Healthcare Decision Maker is: Legal Next of Kin   PCP Verified by CM No  (from [place], [place])   Prior Functional Level Independent in ADLs/IADLs   Current Functional Level Assistance with the following:;Cooking;Housework;Shopping;Mobility   Can patient return to prior living arrangement Yes   Ability to make needs known: Good   Family able to assist with home care needs: Yes   Would you like for me to discuss the discharge plan with any other family members/significant others, and if so, who? Yes  (grandson (Germán))   Financial Resources Medicare  ([employer])   Social/Functional History   Lives With Family   Type of Home House   Home Layout  Two level;Able to Live on Main level with bedroom/bathroom   Home Access Stairs to enter with rails   Entrance Stairs - Number of Steps 3   Home Equipment Cane;Walker - Rolling;Rollator   Receives Help From Family   ADL Assistance Independent   Homemaking Assistance Needs assistance   Ambulation Assistance Independent   Transfer Assistance Independent   Active  No   Patient's  Info family   Occupation Retired   Discharge Planning   Type of Residence House   Living Arrangements Family Members   Current Services Prior To Admission None   Potential Assistance Needed Outpatient PT/OT;Home Care   DME Ordered? No   Potential Assistance Purchasing Medications No   Type of Home Care Services None   Patient expects to be discharged to: [facility]   Care Manager, [facility]  [phone]

## 2024-10-11 NOTE — PLAN OF CARE
Problem: Safety - Adult  Goal: Free from fall injury  10/11/2024 1025 by Catalino Palacios RN  Outcome: Progressing  10/11/2024 0152 by Zoila Major RN  Outcome: Progressing     Problem: Discharge Planning  Goal: Discharge to home or other facility with appropriate resources  10/11/2024 1025 by Catalino Palacios RN  Outcome: Progressing  10/11/2024 0152 by Zoila Major RN  Outcome: Progressing  Flowsheets (Taken 10/10/2024 2000)  Discharge to home or other facility with appropriate resources: Identify barriers to discharge with patient and caregiver     Problem: Pain  Goal: Verbalizes/displays adequate comfort level or baseline comfort level  10/11/2024 1025 by Catalino Palacios RN  Outcome: Progressing  10/11/2024 0152 by Zoila Major, RN  Outcome: Progressing

## 2024-10-11 NOTE — PROGRESS NOTES
Patient: Abe Duque MRN: [id number]  SSN: [id number]    YOB: 1945  Age: 79 y.o.  Sex: male        ADMITTED: 10/7/2024 to Clinton Tolbert Jr., MD by Grupo Cooney MD for Dehydration [E86.0]  Hypokalemia [E87.6]  Acute hypokalemia [E87.6]  Heart murmur [R01.1]  JUNE (acute kidney injury) (HCC) [N17.9]  Abnormal electrocardiography [R94.31]  POD# * No surgery found *     Abe Duque is doing fair sitting in recliner . Denies pain parker draining yellow urine.   US pending       Cr  1.47 stable   Uop  1590 ml     Vitals: Temp (24hrs), Av.3 °F (36.8 °C), Min:98.2 °F (36.8 °C), Max:98.4 °F (36.9 °C)    Blood pressure 114/66, pulse 61, temperature 98.2 °F (36.8 °C), temperature source Oral, resp. rate 15, height 1.524 m (5'), weight 50.3 kg (111 lb), SpO2 95%.    Intake and Output:  10/09 1901 - 10/11 0700  In: 900 [P.O.:900]  Out: 3690 [Urine:3690]  No intake/output data recorded.  JENNIFER Output lats 24 hrs: No data found.   JENNIFER Output last 8 hrs: No data found.  BM over last 24 hrs: No data found.    Exam:   Gen: NAD  CV: extremities well perfused  Lungs: nonlabored respirations. Symmetric chest expansion  Ext: no edema  Abdomen: soft NTND NO CVAT  : parker      Labs:  CBC:   Lab Results   Component Value Date/Time    WBC 6.7 10/11/2024 04:57 AM    HCT 34.3 10/11/2024 04:57 AM     10/11/2024 04:57 AM     BMP:   Lab Results   Component Value Date/Time     10/11/2024 04:57 AM    K 3.9 10/11/2024 04:57 AM     10/11/2024 04:57 AM    CO2 27 10/11/2024 04:57 AM    BUN 23 10/11/2024 04:57 AM     Cultures:   Results       Procedure Component Value Units Date/Time    Urine Culture [[id number]] Collected: 10/08/24 0425    Order Status: Completed Specimen: Urine Updated: 10/09/24 0749     Special Requests NO SPECIAL REQUESTS        Culture No growth (<1,000 CFU/ML)       Blood Culture 2 [[id number]] Collected: 10/08/24 0345    Order Status: Completed Specimen: Blood Updated: 10/11/24

## 2024-10-11 NOTE — PROGRESS NOTES
Results reviewed    Pt to maintain parker till OP follow up   Urology will arrange VT    Will sign off for now     US: US Result (most recent):  US RETROPERITONEAL COMPLETE 10/11/2024    Narrative  EXAM: US RETROPERITONEAL COMPLETE    INDICATION: Reevaluate hydronephrosis after bladder decompression by Parker  catheter for 48 hours..    COMPARISON: Renal ultrasonography 10/8/2024, CT abdomen 10/9/2024    TECHNIQUE:  Real-time sonography of the kidneys, retroperitoneum and bladder was performed  with multiple static images obtained.    FINDINGS:  RIGHT KIDNEY:  The right kidney has abnormally increased echogenicity with no mass, stone or  hydronephrosis. Mild renal cortical thinning. The right kidney measures 9.0 cm  in length.    LEFT KIDNEY:  The left kidney has abnormally increased echogenicity with  no mass, stone or  hydronephrosis. Mild renal cortical thinning. The left kidney measures 10.0 cm  in length.    RETROPERITONEUM:  The aorta is not seen  The aortic bifurcation is not seen  The IVC is not seen  No retroperitoneal mass is identified.    BLADDER:  The urinary bladder is decompressed by a Parker balloon catheter and not assessed    Impression  1. Resolution of hydronephrosis sonographically.  2. Abnormally increased echogenicity of both kidneys with cortical thinning,  suggesting medical renal parenchymal disease. Correlate with renal function  parameters.  3. Urinary bladder not assessed.    Electronically signed by ASHA THOMASON

## 2024-10-11 NOTE — PROGRESS NOTES
Physical Therapy  Consult received and chart reviewed. Patient is currently having ECHO at bedside.  Will defer evaluation at this time and follow back later.  Thank you,  Heidi Sun, PT

## 2024-10-11 NOTE — PLAN OF CARE
Problem: Physical Therapy - Adult  Goal: By Discharge: Performs mobility at highest level of function for planned discharge setting.  See evaluation for individualized goals.  Description: FUNCTIONAL STATUS PRIOR TO ADMISSION: Patient was modified independent using a single point cane or rollator for functional mobility.    HOME SUPPORT PRIOR TO ADMISSION: The patient lived with wife but did not require assist.Patient lives in [facility] and is visiting daughter and grandson    Physical Therapy Goals  Initiated 10/11/2024  1.  Patient will move from supine to sit and sit to supine , scoot up and down, and roll side to side in bed with independence within 7 day(s).    2.  Patient will transfer from bed to chair and chair to bed with modified independence using the least restrictive device within 7 day(s).  3.  Patient will perform sit to stand with modified independence within 7 day(s).  4.  Patient will ambulate with modified independence for 250 feet with the least restrictive device within 7 day(s).   5.  Patient will ascend/descend 2 stairs with 1 handrail(s) with supervision/set-up within 7 day(s).   Outcome: Progressing   PHYSICAL THERAPY EVALUATION    Patient: Abe Duque (79 y.o. male)  Date: 10/11/2024  Primary Diagnosis: Dehydration [E86.0]  Hypokalemia [E87.6]  Acute hypokalemia [E87.6]  Heart murmur [R01.1]  JUNE (acute kidney injury) (HCC) [N17.9]  Abnormal electrocardiography [R94.31]       Precautions: Restrictions/Precautions: Fall Risk                    Patient speaks Mandarin.  used for therapy session: Maria De Jesus#[id number]  ASSESSMENT :   Patient seen for PT evaluation following admission for dehydration and hypokalemia.  used for tx session as no family is present.  Patient presents with generalized weakness and mildly impaired functional mobility and balance. Patient requires CGA for overall mobility and occasional VC for safety especially with transfers. Patient ambulated 100 feet in

## 2024-10-12 VITALS
OXYGEN SATURATION: 95 % | SYSTOLIC BLOOD PRESSURE: 121 MMHG | BODY MASS INDEX: 21.79 KG/M2 | HEIGHT: 60 IN | WEIGHT: 111 LBS | RESPIRATION RATE: 10 BRPM | DIASTOLIC BLOOD PRESSURE: 79 MMHG | HEART RATE: 62 BPM | TEMPERATURE: 98.2 F

## 2024-10-12 LAB
ALBUMIN SERPL-MCNC: 2.4 G/DL (ref 3.5–5)
ALBUMIN/GLOB SERPL: 0.6 (ref 1.1–2.2)
ALP SERPL-CCNC: 73 U/L (ref 45–117)
ALT SERPL-CCNC: 25 U/L (ref 12–78)
ANION GAP SERPL CALC-SCNC: 5 MMOL/L (ref 2–12)
AST SERPL-CCNC: 41 U/L (ref 15–37)
BILIRUB SERPL-MCNC: 0.4 MG/DL (ref 0.2–1)
BUN SERPL-MCNC: 30 MG/DL (ref 6–20)
BUN/CREAT SERPL: 20 (ref 12–20)
CALCIUM SERPL-MCNC: 8.3 MG/DL (ref 8.5–10.1)
CHLORIDE SERPL-SCNC: 109 MMOL/L (ref 97–108)
CO2 SERPL-SCNC: 25 MMOL/L (ref 21–32)
CREAT SERPL-MCNC: 1.47 MG/DL (ref 0.7–1.3)
GLOBULIN SER CALC-MCNC: 4.3 G/DL (ref 2–4)
GLUCOSE SERPL-MCNC: 132 MG/DL (ref 65–100)
MAGNESIUM SERPL-MCNC: 2.5 MG/DL (ref 1.6–2.4)
POTASSIUM SERPL-SCNC: 4.1 MMOL/L (ref 3.5–5.1)
PROT SERPL-MCNC: 6.7 G/DL (ref 6.4–8.2)
SODIUM SERPL-SCNC: 139 MMOL/L (ref 136–145)

## 2024-10-12 PROCEDURE — 83735 ASSAY OF MAGNESIUM: CPT

## 2024-10-12 PROCEDURE — 6360000002 HC RX W HCPCS: Performed by: STUDENT IN AN ORGANIZED HEALTH CARE EDUCATION/TRAINING PROGRAM

## 2024-10-12 PROCEDURE — 96372 THER/PROPH/DIAG INJ SC/IM: CPT

## 2024-10-12 PROCEDURE — 6370000000 HC RX 637 (ALT 250 FOR IP): Performed by: NURSE PRACTITIONER

## 2024-10-12 PROCEDURE — G0378 HOSPITAL OBSERVATION PER HR: HCPCS

## 2024-10-12 PROCEDURE — 2580000003 HC RX 258: Performed by: STUDENT IN AN ORGANIZED HEALTH CARE EDUCATION/TRAINING PROGRAM

## 2024-10-12 PROCEDURE — 6370000000 HC RX 637 (ALT 250 FOR IP): Performed by: INTERNAL MEDICINE

## 2024-10-12 PROCEDURE — 80053 COMPREHEN METABOLIC PANEL: CPT

## 2024-10-12 PROCEDURE — 36415 COLL VENOUS BLD VENIPUNCTURE: CPT

## 2024-10-12 RX ORDER — ASPIRIN 81 MG/1
81 TABLET, CHEWABLE ORAL DAILY
Qty: 90 TABLET | Refills: 3 | Status: SHIPPED | OUTPATIENT
Start: 2024-10-13

## 2024-10-12 RX ORDER — ATORVASTATIN CALCIUM 40 MG/1
40 TABLET, FILM COATED ORAL NIGHTLY
Qty: 30 TABLET | Refills: 5 | Status: SHIPPED | OUTPATIENT
Start: 2024-10-12

## 2024-10-12 RX ORDER — POTASSIUM CHLORIDE 1500 MG/1
40 TABLET, EXTENDED RELEASE ORAL ONCE
Status: DISCONTINUED | OUTPATIENT
Start: 2024-10-12 | End: 2024-10-12 | Stop reason: HOSPADM

## 2024-10-12 RX ORDER — ASPIRIN 81 MG/1
81 TABLET, CHEWABLE ORAL DAILY
Qty: 90 TABLET | Refills: 3 | Status: SHIPPED | OUTPATIENT
Start: 2024-10-13 | End: 2024-10-12

## 2024-10-12 RX ORDER — TAMSULOSIN HYDROCHLORIDE 0.4 MG/1
0.4 CAPSULE ORAL DAILY
Qty: 30 CAPSULE | Refills: 0 | Status: SHIPPED | OUTPATIENT
Start: 2024-10-12 | End: 2024-10-12

## 2024-10-12 RX ORDER — ACETAMINOPHEN 325 MG/1
650 TABLET ORAL EVERY 6 HOURS PRN
Status: SHIPPED | COMMUNITY
Start: 2024-10-12

## 2024-10-12 RX ORDER — AMLODIPINE BESYLATE 5 MG/1
5 TABLET ORAL DAILY
Qty: 30 TABLET | Refills: 5 | Status: SHIPPED | OUTPATIENT
Start: 2024-10-13 | End: 2024-10-12

## 2024-10-12 RX ORDER — TAMSULOSIN HYDROCHLORIDE 0.4 MG/1
0.4 CAPSULE ORAL DAILY
Qty: 30 CAPSULE | Refills: 2 | Status: SHIPPED | OUTPATIENT
Start: 2024-10-12

## 2024-10-12 RX ORDER — ATORVASTATIN CALCIUM 20 MG/1
40 TABLET, FILM COATED ORAL NIGHTLY
Qty: 30 TABLET | Refills: 5 | Status: SHIPPED | OUTPATIENT
Start: 2024-10-12 | End: 2024-10-12

## 2024-10-12 RX ORDER — AMLODIPINE BESYLATE 5 MG/1
5 TABLET ORAL DAILY
Qty: 30 TABLET | Refills: 5 | Status: SHIPPED | OUTPATIENT
Start: 2024-10-13

## 2024-10-12 RX ADMIN — ASPIRIN 81 MG: 81 TABLET, CHEWABLE ORAL at 09:59

## 2024-10-12 RX ADMIN — ACETAMINOPHEN 650 MG: 325 TABLET ORAL at 06:48

## 2024-10-12 RX ADMIN — SODIUM CHLORIDE, PRESERVATIVE FREE 10 ML: 5 INJECTION INTRAVENOUS at 10:00

## 2024-10-12 RX ADMIN — AMLODIPINE BESYLATE 5 MG: 5 TABLET ORAL at 09:59

## 2024-10-12 RX ADMIN — HEPARIN SODIUM 5000 UNITS: 5000 INJECTION INTRAVENOUS; SUBCUTANEOUS at 09:59

## 2024-10-12 ASSESSMENT — PAIN SCALES - GENERAL
PAINLEVEL_OUTOF10: 0
PAINLEVEL_OUTOF10: 0

## 2024-10-12 NOTE — DISCHARGE INSTRUCTIONS
Multiple issues in the hospital that will need to be followed overtime at   Can be done in [facility] if go home soon or locally if you remain here    Stop prior medications at home, use only the medicines we gave you    Potassium levels were dangerously low 1.9 (normal 3.7 to 5.0), now resolved    2.   Acute kidney dysfunction on top of mild chronic kidney disease present even at hospital in [place]     Acute injury likely related to the poor bladder emptying, resolved once the urinary catheter was put in    3.    Urinary retention likely due to prostate enlargement(very common in older men) = bladder was not emptying fully       Urinary catheter was placed, will remain in place at home till you see the urology doctors in 7 to 10 days(Dr Castillo at [facility])      Flomax or tamsulosin is a medicine to try to improve the bladder emptying, to hopefully help get the catheter removed     4.  Concern for underlying coronary artery disease or heart disease      Elevated heart enzyme at admission, ? Related to low Potassium or poor blood flow to heart     Stress test with evidence of prior heart attack, but no current areas at risk      Aspirin 81 mg daily, lipitor or atorvastatin 40 mg daily to prevent further heart attacks                Use amlodipine for blood pressure control                No smoking     Return to ER for chest discomfort, increased shortness of breath     See Dr Estevez at the [facility] at scheduled appointment    5. Pre diabetes with mildly elevated blood sugars, no need for treatment now but needs to be followed              At risk in future for developing diabetes mellitus type 2

## 2024-10-12 NOTE — PROGRESS NOTES
diastolic dysfunction with normal LAP.   Left Atrium Left atrium is mildly dilated.   Right Ventricle Right ventricle size is normal. Normal systolic function.   Right Atrium Right atrium size is normal.   Aortic Valve Sclerosis of the aortic valve cusps. Mild regurgitation. No stenosis.   Mitral Valve Valve structure is normal. Mild regurgitation. No stenosis noted.   Tricuspid Valve Valve structure is normal. No regurgitation. No stenosis noted.   Pulmonic Valve The pulmonic valve visualization is suboptimal but appears to be functioning normally. Physiologically normal regurgitation. No stenosis noted.   Aorta Mildly dilated aortic root.   IVC/Hepatic Veins IVC size is normal.   Pericardium No pericardial effusion.     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    Indeterminate 4 mm left lower lobe pulmonary nodule.     Code Status: Full  DVT Prophylaxis: Heparin  Baseline: Independent ambulatory at home    History, assessment and plan for 10/11/2024:     Estimated discharge date: 10/11/2024    Needs to be done before discharge:  Improved electrolytes, ambulation    Reason for physician visit:    No plan yet to go back to [facility], may be in [place]   VQ scan negative for perfusion defects  Echo okay except for the inferior hypokinesis  Spoke with Dr hussein, no urgent need for cath, will see back as outpatient  Discussed with RN events overnight.     No recent nausea vomiting or diarrhea  No CP or  SOB  Overall feels better  No HA, SOB, cough, CP, abdominal pain, N/V, diarrhea    Medical Decision Making:   I personally reviewed labs: CBC, CMP  I personally reviewed imaging:  I personally reviewed EKG:  Toxic drug monitoring:   Discussed case with:   CM/NS/PT/pharmacy on IDR rounds   Melida by phone x 10 minutes    Total NON critical care TIME:  25  Minutes    Total CRITICAL CARE TIME Spent:   Minutes non procedure based    Objective:     VITALS:   Last 24hrs VS reviewed since prior progress note. Most recent are:  Patient Vitals for the past 24 hrs:   BP Temp Temp src Pulse Resp SpO2 Height Weight   10/11/24 1939 107/68 -- Oral 63 16 97 % -- --   10/11/24 1623 95/67 98 °F (36.7 °C) Oral 61 18 97 % -- --   10/11/24 1113 123/76 -- -- 54 -- -- 1.524 m (5') 50.3 kg (111 lb)   10/11/24 0900 123/76 97.9 °F (36.6 °C) Oral 54 14 95 % -- --   10/11/24 0437 114/66 98.2 °F (36.8 °C) Oral 61 15 95 % -- --         Intake/Output Summary (Last 24 hours) at 10/11/2024 2127  Last data filed at 10/11/2024 1826  Gross per 24 hour   Intake --   Output 2840 ml   Net -2840 ml        I had a face to face encounter and independently examined this patient on 10/11/2024, as outlined below:    PHYSICAL EXAM:  General: Alert, cooperative  EENT:  Anicteric sclerae.  Resp:    CV:    GI:    Neurologic:    Psych:   Not anxious nor agitated  Skin:  No rashes.  No jaundice    Reviewed most current lab test results and cultures  YES  Reviewed most current radiology test results   YES  Review and summation of old records today    NO  Reviewed patient's current orders and MAR    YES  PMH/SH reviewed - no change compared to H&P    ________________________________________________________________________        Comments   >50% of visit spent in counseling and coordination of care     ________________________________________________________________________  Clinton Tolbert Jr, MD     Procedures: see electronic medical records for all

## 2024-10-12 NOTE — PROGRESS NOTES
End of Shift Note    Bedside shift change report given to AMY Huang (oncoming nurse) by Imtiaz Dowell LPN (offgoing nurse).  Report included the following information SBAR, Kardex, Intake/Output, MAR, Accordion, Recent Results, Med Rec Status, and Cardiac Rhythm NSR    Shift worked:  3359-0632     Shift summary and any significant changes:     Labs collected. No complaints of pain during shift. No complaints at the time of shift change. Virtual  at bedside intermittently. Plan of care ongoing.   Concerns for physician to address:  N/a     Zone phone for oncoming shift:   [phone]       Activity:     Number times ambulated in hallways past shift: 0  Number of times OOB to chair past shift: 2    Cardiac:   Cardiac Monitoring: Yes           Access:  Current line(s): PIV     Genitourinary:   Urinary status: parker    Respiratory:      Chronic home O2 use?: NO  Incentive spirometer at bedside: N/A       GI:     Current diet:  ADULT DIET; Regular  Passing flatus: YES  Tolerating current diet: YES       Pain Management:   Patient states pain is manageable on current regimen: YES    Skin:     Interventions: float heels, increase time out of bed, PT/OT consult, limit briefs, and internal/external urinary devices    Patient Safety:  Fall Score:    Interventions: bed/chair alarm, assistive device (walker, cane. etc), gripper socks, pt to call before getting OOB, and stay with me (per policy)       Length of Stay:  Expected LOS: 5  Actual LOS: 0      Imtiaz Doewll LPN

## 2024-10-12 NOTE — PROGRESS NOTES
Progress Note      10/12/2024 11:15 AM  NAME: Abe Duque   MRN:  [id number]   Admit Diagnosis: Dehydration [E86.0]  Hypokalemia [E87.6]  Acute hypokalemia [E87.6]  Heart murmur [R01.1]  JUNE (acute kidney injury) (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  Prominent inferolateral infarction.  LVEF 45%.     Echo with EF 50%     Plan:     Continue aspirin  Continue Lipitor 10 mg daily  Continue amlodipine 10 mg daily  No b blocker due to bradycardia   No AcEI/ ARBs due to renal insufficiency      Discussed with patient regarding result of stress test and echo again again today which shows prominent inferolateral infarction.  He denied any history of heart attack.  Denied any previous left heart catheterization.  We have discussed about left heart catheterization AGAIN to evaluate coronary anatomy.  Discussed risk and benefit. However, he does not want to proceed with procedure.  Since he is asymptomatic and no reversible ischemia that is reasonable.  I have discussed with him about needing medical therapy for CAD and follow-up with cardiology as outpatient.   He has appointment with me in 2-3 wks      Discussed with Grand son, Germán as well.            Thank you for this consult and allowing me to take part in this patients care.  Please call with questions.    Subjective:     HPI:     No CP or SOB        Objective:      Physical Exam:    Last 24hrs VS reviewed since prior progress note. Most recent are:    /79   Pulse 62   Temp 98.2 °F (36.8 °C) (Oral)   PRN    0.9 % sodium chloride infusion   IntraVENous PRN    heparin (porcine) injection 5,000 Units  5,000 Units SubCUTAneous BID    ondansetron (ZOFRAN-ODT) disintegrating tablet 4 mg  4 mg Oral Q8H PRN    Or    ondansetron (ZOFRAN) injection 4 mg  4 mg IntraVENous Q6H PRN    polyethylene glycol (GLYCOLAX) packet 17 g  17 g Oral Daily PRN    aspirin chewable tablet 81 mg  81 mg Oral Daily    lidocaine (XYLOCAINE) 2 % uro-jet   Topical PRN    HYDROmorphone HCl PF (DILAUDID) injection 0.25 mg  0.25 mg IntraVENous Q4H PRN    hydrALAZINE (APRESOLINE) injection 10 mg  10 mg IntraVENous Q6H PRN              Mary Estevez MD

## 2024-10-12 NOTE — CARE COORDINATION
Transition of Care Plan:    RUR: OBS  Prior Level of Functioning:   Disposition: Home w/family  If SNF or IPR: Date FOC offered:   Date FOC received:   Accepting facility:   Date authorization started with reference number:   Date authorization received and expires:   Follow up appointments:   DME needed: n/a  Transportation at discharge: family  IM/IMM Medicare/ letter given: n/a  Is patient a [place] and connected with [facility]?    If yes, was [place] transfer form completed and [facility] notified?   Caregiver Contact:   Discharge Caregiver contacted prior to discharge?   Care Conference needed?   Barriers to discharge:     CM left vm for issac Germán, requesting a return call to discuss d/c planning.  Voicemail also stated pt was ready for d/c.  CM will continue to follow     Luzmaria Dumont  Ext [phone]

## 2024-10-12 NOTE — DISCHARGE SUMMARY
Hospitalist Progress Note    NAME:   Abe Duque   : 1945   MRN: [id number]     Admit date: 10/7/2024    Discharge date: 10/12/24    PCP: No primary care provider on file.    Discharge Diagnoses:    Discharge Medications:  Current Discharge Medication List        START taking these medications    Details   tamsulosin (FLOMAX) 0.4 MG capsule Take 1 capsule by mouth daily  Qty: 30 capsule, Refills: 0      acetaminophen (TYLENOL) 325 MG tablet Take 2 tablets by mouth every 6 hours as needed for Pain      aspirin 81 MG chewable tablet Take 1 tablet by mouth daily  Qty: 90 tablet, Refills: 3      atorvastatin (LIPITOR) 20 MG tablet Take 2 tablets by mouth nightly  Qty: 30 tablet, Refills: 5      amLODIPine (NORVASC) 5 MG tablet Take 1 tablet by mouth daily  Qty: 30 tablet, Refills: 5             Follow-up Information       Follow up With Specialties Details Why Contact Info    Mary Estevez MD Cardiology, Internal Medicine Go on 10/29/2024 @ 1:30PM [address]   [address] [address] [phone]      Darlene Galvan DO Family Medicine Go on 10/30/2024 at 10:00am for your NEW PATIENT PCP appt. Please arrive 15 minutes early, bring photo ID, insurance cards, copay, all current medication bottles including any over the counter vitamins/meds, and any completed forms. Please allow 24-48 hours prior to your appt time/date to reschedule if needed.    New family doctor to follow blood pressure and pre diabetes, kidney function and electrolytes [address] [facility] [address] [phone]      [facility]  Follow up As needed - [facility] that comes to your home. You may call them if you would like to set up an appt to be seen for a follow up while waiting to be seen by your PCP. [address]  [address] [address] [phone]    Otf Castillo MD Urology Schedule an appointment as soon as possible for a visit in 7 day(s) to

## 2024-10-13 LAB
BACTERIA SPEC CULT: NORMAL
BACTERIA SPEC CULT: NORMAL
SERVICE CMNT-IMP: NORMAL
SERVICE CMNT-IMP: NORMAL